# Patient Record
Sex: MALE | Race: WHITE | Employment: PART TIME | URBAN - NONMETROPOLITAN AREA
[De-identification: names, ages, dates, MRNs, and addresses within clinical notes are randomized per-mention and may not be internally consistent; named-entity substitution may affect disease eponyms.]

---

## 2019-07-29 RX ORDER — FLUTICASONE PROPIONATE 50 MCG
2 SPRAY, SUSPENSION (ML) NASAL PRN
COMMUNITY
Start: 2018-08-09 | End: 2020-01-16 | Stop reason: SDUPTHER

## 2019-07-29 RX ORDER — FEXOFENADINE HCL 180 MG/1
180 TABLET ORAL DAILY
COMMUNITY
Start: 2016-09-21

## 2019-07-29 SDOH — HEALTH STABILITY: MENTAL HEALTH: HOW OFTEN DO YOU HAVE A DRINK CONTAINING ALCOHOL?: NEVER

## 2019-08-01 ENCOUNTER — CLINICAL DOCUMENTATION (OUTPATIENT)
Dept: FAMILY MEDICINE CLINIC | Age: 19
End: 2019-08-01

## 2019-08-01 NOTE — PROGRESS NOTES
Note created from [address] [place] [address] for Dr. Naye Philippe 19 [address] Acct#: [id number]  Maranda Carmen : 2000 Sex: M Age: 19 years  Subjective  CC: Patient is doing quite well and is lost another 10 pounds over the last year. Continues to  exercise on a regular basis. Denying excessive thirst, urination, hunger. Very strong family history  of early heart disease and diabetes. The patient does have allergic rhinitis. Symptoms are  controlled with a combination of Flonase and Allegra. He does need to take these on a daily basis. We did discuss possible referral to an allergist if symptoms worsen. He denies any wheezing or  difficulty breathing. HPI:  ROS:  Const: General health stated as good. Eyes: Denies eye symptoms. ENMT: Reports itching of the ears. Reports allergies. CV: Denies cardiovascular symptoms. Resp: Denies respiratory symptoms. GI: Minimally elevated liver functions  : Denies urinary symptoms. Musculo: Denies musculoskeletal symptoms. Skin: Denies skin, hair and nail symptoms. Neuro: GANGLION CYSTS  Psych: Denies psychiatric symptoms. Endocrine: Reports obesity. Hema/Lymph: Denies hematologic symptoms. Current Meds: Allegra Allergy 180 mg 1 by mouth every day  Flonase 50 mcg/Act 2 sprays ea nostril qhs as needed  Allergies: NKDA  PMH:  Health Maintenance:  Physical Exam - (2018)  Menactra - (3/25/2016)  Medical Problems:  Seasonal Allergies, Obesity, Ganglion Cysts, Hyperlipidemia  Surgical Hx:  Ganglion Cyst Removal - CATERINA-WRIST/TAILBONE  Reviewed and updated. FH:  Father:   --had hx of kidney stones, Glioblastoma Multiforme- Age 35. Mother:  Obesity, Non Insulin Dependent Diabetes, Hypertension, Hyperlipidemia. Reviewed, no changes. SH:  Marital: Single. Personal Habits: Cigarette Use: No Exposure To 2ND Hand Smoke, Does Not Smoke. Alcohol:  Never used alcohol. Daily Caffeine: Consumes on average 1 soda per day. Exercise Type: unspecified  Care Plan:  Lab Orders : Comp Metabolic Panel W/GFR  Lipid Panel  Rafa Munoz  2000 Page #3  Assessment #3: Z00.00 Encounter for general adult medical examination without abnormal  findings  Care Plan:  Follow Up : Followup:. Patient is to have lab work. Medication management was performed. The patient is  to be seen back in one year. He is congratulated on his weight loss and exercise regimen and  urged to continue. Seen By: Electronically signed by Rachid Hinojosa.  Carolina Ndiaye M.D. on 2018 at 2:24 pm

## 2019-08-02 ENCOUNTER — OFFICE VISIT (OUTPATIENT)
Dept: FAMILY MEDICINE CLINIC | Age: 19
End: 2019-08-02
Payer: COMMERCIAL

## 2019-08-02 VITALS
WEIGHT: 231 LBS | HEART RATE: 75 BPM | OXYGEN SATURATION: 98 % | DIASTOLIC BLOOD PRESSURE: 72 MMHG | HEIGHT: 69 IN | SYSTOLIC BLOOD PRESSURE: 118 MMHG | BODY MASS INDEX: 34.21 KG/M2

## 2019-08-02 DIAGNOSIS — E78.2 MIXED HYPERLIPIDEMIA: ICD-10-CM

## 2019-08-02 DIAGNOSIS — J30.9 ALLERGIC RHINITIS, UNSPECIFIED SEASONALITY, UNSPECIFIED TRIGGER: Primary | ICD-10-CM

## 2019-08-02 DIAGNOSIS — E66.09 CLASS 1 OBESITY DUE TO EXCESS CALORIES WITHOUT SERIOUS COMORBIDITY WITH BODY MASS INDEX (BMI) OF 34.0 TO 34.9 IN ADULT: ICD-10-CM

## 2019-08-02 PROBLEM — E78.5 HYPERLIPIDEMIA: Status: ACTIVE | Noted: 2019-08-02

## 2019-08-02 LAB
CHP ED QC CHECK: NORMAL
GLUCOSE BLD-MCNC: 95 MG/DL

## 2019-08-02 PROCEDURE — 82962 GLUCOSE BLOOD TEST: CPT | Performed by: INTERNAL MEDICINE

## 2019-08-02 PROCEDURE — 99395 PREV VISIT EST AGE 18-39: CPT | Performed by: INTERNAL MEDICINE

## 2019-08-02 RX ORDER — MONTELUKAST SODIUM 10 MG/1
10 TABLET ORAL DAILY
Qty: 30 TABLET | Refills: 3 | Status: SHIPPED | OUTPATIENT
Start: 2019-08-02 | End: 2019-11-13 | Stop reason: SDUPTHER

## 2019-08-02 NOTE — PROGRESS NOTES
Patient has never smoked. Objective  BP: 120/62 Pulse: 70 Ht: 69\" 5'9\" Ht cm: 175.3 Wt: 207lb Wt [id number] BMI: 30.6 BSA: 2.10  Exam:  Const: Appears healthy, well developed and well nourished. Appears obese. Eyes: EOMI in both eyes. PERRL. ENMT: External ears WNL. Tympanic membranes: decreased light reflex. External nose WNL. Moistness and normal color of the nasal mucosae. Septum is in the midline. Dentition is in good  repair. Gums appear healthy. Posterior pharynx shows moderate and clear postnasal drip, but no  exudate, irritation or redness. Neck: Supple and symmetric. Palpation reveals no adenopathy. No masses appreciated. Thyroid: no nodule appreciated or thyromegaly. No jugular venous distention. Resp: Respirations are unlabored. Respiration rate is normal. Auscultate good airflow. No rales,  rhonchi or wheezes appreciated over the lungs bilaterally. CV: Rhythm is regular. S1 is normal. S2 is normal. Carotids: no bruits. Abdominal aorta: not  palpable. Pedal pulses: 2+ and equal bilaterally. Extremities: No clubbing, cyanosis or edema. Abdomen: Bowel sounds are normoactive. Palpation reveals softness, with no distension,  organomegaly or tenderness. No abdominal masses palpable. No palpable hepatosplenomegaly. Musculo: Walks with a normal gait. Upper Extremities: ROM: Full ROM bilaterally. Lower  Extremities: ROM: Full ROM bilaterally. Skin: Dry and warm with no rash. Neuro: Alert and oriented x3. Mood is normal. Affect is normal. Speech is articulate and  fluent. DTR's are symmetric, intact and 2+ bilaterally. Sylwia Molina was seen today for annual exam.    Diagnoses and all orders for this visit:    Allergic rhinitis, unspecified seasonality, unspecified trigger  -     External Referral To Allergy  -     montelukast (SINGULAIR) 10 MG tablet;  Take 1 tablet by mouth daily    Class 1 obesity due to excess calories without serious comorbidity with body mass index (BMI) of 34.0 to 34.9 in adult  -

## 2019-10-09 ENCOUNTER — OFFICE VISIT (OUTPATIENT)
Dept: FAMILY MEDICINE CLINIC | Age: 19
End: 2019-10-09
Payer: COMMERCIAL

## 2019-10-09 DIAGNOSIS — F41.9 ANXIETY: ICD-10-CM

## 2019-10-09 PROCEDURE — 99213 OFFICE O/P EST LOW 20 MIN: CPT | Performed by: INTERNAL MEDICINE

## 2019-11-13 ENCOUNTER — OFFICE VISIT (OUTPATIENT)
Dept: FAMILY MEDICINE CLINIC | Age: 19
End: 2019-11-13
Payer: COMMERCIAL

## 2019-11-13 VITALS
BODY MASS INDEX: 33.37 KG/M2 | WEIGHT: 226 LBS | HEART RATE: 74 BPM | DIASTOLIC BLOOD PRESSURE: 62 MMHG | TEMPERATURE: 98.5 F | SYSTOLIC BLOOD PRESSURE: 126 MMHG | OXYGEN SATURATION: 98 %

## 2019-11-13 DIAGNOSIS — J30.9 ALLERGIC RHINITIS, UNSPECIFIED SEASONALITY, UNSPECIFIED TRIGGER: ICD-10-CM

## 2019-11-13 DIAGNOSIS — F41.9 ANXIETY: ICD-10-CM

## 2019-11-13 DIAGNOSIS — Z23 NEED FOR IMMUNIZATION AGAINST INFLUENZA: Primary | ICD-10-CM

## 2019-11-13 DIAGNOSIS — E78.2 MIXED HYPERLIPIDEMIA: ICD-10-CM

## 2019-11-13 PROCEDURE — 90686 IIV4 VACC NO PRSV 0.5 ML IM: CPT | Performed by: INTERNAL MEDICINE

## 2019-11-13 PROCEDURE — 90471 IMMUNIZATION ADMIN: CPT | Performed by: INTERNAL MEDICINE

## 2019-11-13 PROCEDURE — 99213 OFFICE O/P EST LOW 20 MIN: CPT | Performed by: INTERNAL MEDICINE

## 2019-11-13 RX ORDER — MONTELUKAST SODIUM 10 MG/1
10 TABLET ORAL DAILY
Qty: 30 TABLET | Refills: 3 | Status: SHIPPED
Start: 2019-11-13 | End: 2020-04-29 | Stop reason: SDUPTHER

## 2019-11-13 RX ORDER — SERTRALINE HYDROCHLORIDE 100 MG/1
100 TABLET, FILM COATED ORAL DAILY
Qty: 30 TABLET | Refills: 5 | Status: SHIPPED
Start: 2019-11-13 | End: 2020-02-07 | Stop reason: ALTCHOICE

## 2019-11-13 ASSESSMENT — PATIENT HEALTH QUESTIONNAIRE - PHQ9
SUM OF ALL RESPONSES TO PHQ QUESTIONS 1-9: 1
SUM OF ALL RESPONSES TO PHQ9 QUESTIONS 1 & 2: 1
1. LITTLE INTEREST OR PLEASURE IN DOING THINGS: 0
2. FEELING DOWN, DEPRESSED OR HOPELESS: 1
SUM OF ALL RESPONSES TO PHQ QUESTIONS 1-9: 1

## 2020-01-16 NOTE — TELEPHONE ENCOUNTER
Last Appointment:  11/13/2019  Future Appointments   Date Time Provider Chhaya Du   8/7/2020  7:30 AM Elly Mayfield  [address]      Patient's mother calling in to request a refill on Flonase sent to [facility] in [place].   Order pended, thank you

## 2020-01-17 RX ORDER — FLUTICASONE PROPIONATE 50 MCG
2 SPRAY, SUSPENSION (ML) NASAL PRN
Qty: 1 BOTTLE | Refills: 2 | Status: SHIPPED
Start: 2020-01-17 | End: 2020-10-28 | Stop reason: SDUPTHER

## 2020-02-05 ENCOUNTER — TELEPHONE (OUTPATIENT)
Dept: FAMILY MEDICINE CLINIC | Age: 20
End: 2020-02-05

## 2020-02-05 NOTE — TELEPHONE ENCOUNTER
Mom  -  Yordy Jennings  [phone]        She called to ask if he could be seen by Dr Paul Acosta today for his severe anxiety. He had stopped taking the Prozac and is having a \"really hard time\"      She is there because she was afraid to leave him alone. He is not suicidal or going to hurt himself, but very emotional.        Dr Alie Luu advise to them is to go to the ER at [facility] on [address]. Mom was agreeable and will take him there now.

## 2020-02-07 ENCOUNTER — OFFICE VISIT (OUTPATIENT)
Dept: FAMILY MEDICINE CLINIC | Age: 20
End: 2020-02-07
Payer: COMMERCIAL

## 2020-02-07 VITALS — OXYGEN SATURATION: 97 % | DIASTOLIC BLOOD PRESSURE: 80 MMHG | HEART RATE: 76 BPM | SYSTOLIC BLOOD PRESSURE: 124 MMHG

## 2020-02-07 PROBLEM — F32.9 REACTIVE DEPRESSION: Status: ACTIVE | Noted: 2020-02-07

## 2020-02-07 PROCEDURE — 99213 OFFICE O/P EST LOW 20 MIN: CPT | Performed by: INTERNAL MEDICINE

## 2020-02-07 RX ORDER — CITALOPRAM 20 MG/1
20 TABLET ORAL DAILY
Qty: 30 TABLET | Refills: 0 | Status: SHIPPED
Start: 2020-02-07 | End: 2020-02-17 | Stop reason: SDUPTHER

## 2020-02-07 RX ORDER — ALPRAZOLAM 0.25 MG/1
0.25 TABLET ORAL NIGHTLY PRN
Qty: 10 TABLET | Refills: 0 | Status: SHIPPED | OUTPATIENT
Start: 2020-02-07 | End: 2020-02-17 | Stop reason: SDUPTHER

## 2020-02-07 NOTE — PROGRESS NOTES
The patient had more difficulty with the higher dose of sertraline. He is currently seeing a counselor. Patient is dropped most of his courses because of the cost and is working at [employer]. His mother is also receiving counseling. He had thoughts yesterday that he did not want to be alive but he had no plan on killing himself or harming others. The patient is having some difficulty sleeping at night. He had done fairly well with the lower dose of sertraline although it was not totally effective at relieving his symptoms. HPI:  ROS:  Const: General health stated as good. Eyes: Denies eye symptoms. ENMT: Reports itching of the ears. Reports allergies. CV: Denies cardiovascular symptoms. Resp: Denies respiratory symptoms. GI: Negative  : Denies urinary symptoms. Musculo: Denies musculoskeletal symptoms. Skin: Denies skin, hair and nail symptoms. Neuro: GANGLION CYSTS  Psych:Stress/anxiety. Endocrine: Reports obesity. Hema/Lymph: Denies hematologic symptoms. Current Meds: Allegra Allergy 180 mg 1 by mouth every day  Flonase 50 mcg/Act 2 sprays ea nostril qhs as needed  Allergies: NKDA  PMH:  Health Maintenance:  Physical Exam - (19)  Menactra - (3/25/2016)  Medical Problems:  Seasonal Allergies Reffered to [facility] 2019  , Obesity, Ganglion Cysts, Hyperlipidemia  Anxiety-good response to Sertraline 2019  Surgical Hx:  Ganglion Cyst Removal - CATERINA-WRIST/TAILBONE  Reviewed and updated. FH:  Father:   --had hx of kidney stones, Glioblastoma Multiforme- Age 35. Mother:  Obesity, Non Insulin Dependent Diabetes, Hypertension, Hyperlipidemia. Reviewed, no changes. SH:  Marital: Single. Personal Habits: Cigarette Use: No Exposure To 2ND Hand Smoke, Does Not Smoke. Alcohol:  Never used alcohol. Daily Caffeine: Consumes on average 1 soda per day. Exercise Type: Walks  sporadically. Reviewed, no changes. Date: 2018  Was the patient queried about smoking behavior?  Yes No  Does the patient currently smoke? Smoking: Patient has never smoked. Objective  BP: 120/62 Pulse: 70 Ht: 69\" 5'9\" Ht cm: 175.3 Wt: 207lb Wt k.895 BMI: 30.6 BSA: 2.10  Exam:  Const: Appears healthy, well developed and well nourished. Appears obese. Eyes: EOMI in both eyes. PERRL. ENMT: External ears WNL. Tympanic membranes: decreased light reflex. External nose WNL. Moistness and normal color of the nasal mucosae. Septum is in the midline. Dentition is in good  repair. Gums appear healthy. Posterior pharynx shows moderate and clear postnasal drip, but no  exudate, irritation or redness. Neck: Supple and symmetric. Palpation reveals no adenopathy. No masses appreciated. Thyroid: no nodule appreciated or thyromegaly. No jugular venous distention. Resp: Respirations are unlabored. Respiration rate is normal. Auscultate good airflow. No rales,  rhonchi or wheezes appreciated over the lungs bilaterally. CV: Rhythm is regular. S1 is normal. S2 is normal. Carotids: no bruits. Abdominal aorta: not  palpable. Pedal pulses: 2+ and equal bilaterally. Extremities: No clubbing, cyanosis or edema. Abdomen: Bowel sounds are normoactive. Palpation reveals softness, with no distension,  organomegaly or tenderness. No abdominal masses palpable. No palpable hepatosplenomegaly. Musculo: Walks with a normal gait. Upper Extremities: ROM: Full ROM bilaterally. Lower  Extremities: ROM: Full ROM bilaterally. Skin: Dry and warm with no rash. Neuro: Alert and oriented x3. Mood is normal. Affect is normal. Speech is articulate and  fluent. DTR's are symmetric, intact and 2+ bilaterally. Jg Lara was seen today for discuss medications. Diagnoses and all orders for this visit:    Anxiety  -     ALPRAZolam (XANAX) 0.25 MG tablet; Take 1 tablet by mouth nightly as needed for Anxiety for up to 10 days. Reactive depression    Other orders  -     citalopram (CELEXA) 20 MG tablet;  Take 1 tablet by mouth daily    The patient

## 2020-02-17 ENCOUNTER — OFFICE VISIT (OUTPATIENT)
Dept: PRIMARY CARE CLINIC | Age: 20
End: 2020-02-17
Payer: COMMERCIAL

## 2020-02-17 VITALS
DIASTOLIC BLOOD PRESSURE: 78 MMHG | HEART RATE: 92 BPM | TEMPERATURE: 97.7 F | HEIGHT: 71 IN | RESPIRATION RATE: 18 BRPM | BODY MASS INDEX: 32.48 KG/M2 | OXYGEN SATURATION: 98 % | SYSTOLIC BLOOD PRESSURE: 128 MMHG | WEIGHT: 232 LBS

## 2020-02-17 PROBLEM — F51.04 PSYCHOPHYSIOLOGICAL INSOMNIA: Status: ACTIVE | Noted: 2020-02-17

## 2020-02-17 PROCEDURE — 99213 OFFICE O/P EST LOW 20 MIN: CPT | Performed by: INTERNAL MEDICINE

## 2020-02-17 RX ORDER — CITALOPRAM 20 MG/1
20 TABLET ORAL DAILY
Qty: 90 TABLET | Refills: 1 | Status: SHIPPED | OUTPATIENT
Start: 2020-02-17 | End: 2020-04-29 | Stop reason: SDUPTHER

## 2020-02-17 RX ORDER — ALPRAZOLAM 0.25 MG/1
0.25 TABLET ORAL NIGHTLY PRN
Qty: 10 TABLET | Refills: 1 | Status: SHIPPED | OUTPATIENT
Start: 2020-02-17 | End: 2020-03-03

## 2020-02-17 NOTE — PROGRESS NOTES
Patient seems to be doing a lot better with the use of Celexa. He is also sleeping better with the Xanax. Anxiety level seems to be much better than it was last week. The patient is decided to drop his major into to pursue human resources. He believes this will be a much better fit. He does have a very good job at [employer]. and he would like to continue working there. HPI:  ROS:  Const: General health stated as good. Eyes: Denies eye symptoms. ENMT: Reports itching of the ears. Reports allergies. CV: Denies cardiovascular symptoms. Resp: Denies respiratory symptoms. GI: Negative  : Denies urinary symptoms. Musculo: Denies musculoskeletal symptoms. Skin: Denies skin, hair and nail symptoms. Neuro: GANGLION CYSTS  Psych:Stress/anxiety. Endocrine: Reports obesity. Hema/Lymph: Denies hematologic symptoms. Current Meds: Allegra Allergy 180 mg 1 by mouth every day  Flonase 50 mcg/Act 2 sprays ea nostril qhs as needed  Allergies: NKDA  PMH:  Health Maintenance:  Physical Exam - (19)  Menactra - (3/25/2016)  Medical Problems:  Seasonal Allergies Reffered to [facility] 2019  , Obesity, Ganglion Cysts, Hyperlipidemia  Anxiety-good response to Sertraline 2019  Surgical Hx:  Ganglion Cyst Removal - CATERINA-WRIST/TAILBONE  Reviewed and updated. FH:  Father:   --had hx of kidney stones, Glioblastoma Multiforme- Age 35. Mother:  Obesity, Non Insulin Dependent Diabetes, Hypertension, Hyperlipidemia. Reviewed, no changes. SH:  Marital: Single. Personal Habits: Cigarette Use: No Exposure To 2ND Hand Smoke, Does Not Smoke. Alcohol:  Never used alcohol. Daily Caffeine: Consumes on average 1 soda per day. Exercise Type: Walks  sporadically. Reviewed, no changes. Date: 2018  Was the patient queried about smoking behavior? Yes No  Does the patient currently smoke? Smoking: Patient has never smoked.   Objective  BP: 120/62 Pulse: 70 Ht: 69\" 5'9\" Ht cm: 175.3 Wt: 207lb Wt k.895 BMI:

## 2020-03-24 RX ORDER — ALPRAZOLAM 0.25 MG/1
0.25 TABLET ORAL 3 TIMES DAILY PRN
Qty: 30 TABLET | Refills: 1 | Status: SHIPPED | OUTPATIENT
Start: 2020-03-24 | End: 2020-04-23

## 2020-04-29 ENCOUNTER — VIRTUAL VISIT (OUTPATIENT)
Dept: PRIMARY CARE CLINIC | Age: 20
End: 2020-04-29
Payer: COMMERCIAL

## 2020-04-29 PROCEDURE — 99213 OFFICE O/P EST LOW 20 MIN: CPT | Performed by: INTERNAL MEDICINE

## 2020-04-29 RX ORDER — MONTELUKAST SODIUM 10 MG/1
10 TABLET ORAL DAILY
Qty: 30 TABLET | Refills: 5 | Status: SHIPPED
Start: 2020-04-29 | End: 2020-10-28 | Stop reason: SDUPTHER

## 2020-04-29 RX ORDER — ALPRAZOLAM 0.25 MG
TABLET ORAL
COMMUNITY
Start: 2020-03-24 | End: 2020-04-29 | Stop reason: SDUPTHER

## 2020-04-29 RX ORDER — CITALOPRAM 20 MG/1
20 TABLET ORAL DAILY
Qty: 90 TABLET | Refills: 1 | Status: SHIPPED
Start: 2020-04-29 | End: 2020-10-28 | Stop reason: SDUPTHER

## 2020-04-29 RX ORDER — ALPRAZOLAM 0.25 MG/1
TABLET ORAL
Qty: 90 TABLET | Refills: 5 | Status: SHIPPED
Start: 2020-04-29 | End: 2020-10-28 | Stop reason: SDUPTHER

## 2020-04-29 NOTE — PROGRESS NOTES
Patient seems to be doing a lot better in terms of his anxiety and depression. He states that the Celexa is working quite well. No significant side effects from medication. He is still seeing a counselor on a regular basis and in fact saw the counselor today. He is decided not to go back to school in the fall and just wait for a while until he is made up his mind about what career he wants to pursue. He thinks this will help quite a bit with his anxiety issues. The patient's allergy symptoms are well controlled on triple therapy. He has not had any symptoms of a sinus infection. This is a video encounter with the patient. HPI:  ROS:  Const: General health stated as good. Eyes: Denies eye symptoms. ENMT: Reports itching of the ears. Reports allergies. Well controlled on current regimen. CV: Denies cardiovascular symptoms. Resp: Denies respiratory symptoms. GI: Negative  : Denies urinary symptoms. Musculo: Denies musculoskeletal symptoms. Skin: Denies skin, hair and nail symptoms. Neuro: GANGLION CYSTS  Psych:Stress/anxiety. Endocrine: Reports obesity. Hema/Lymph: Denies hematologic symptoms.     Current Outpatient Medications:     montelukast (SINGULAIR) 10 MG tablet, Take 1 tablet by mouth daily, Disp: 30 tablet, Rfl: 5    ALPRAZolam (XANAX) 0.25 MG tablet, TAKE ONE TABLET BY MOUTH THREE TIMES A DAY AS NEEDED FOR ANXIETY FOR UP TO 30 DAYS, Disp: 90 tablet, Rfl: 5    citalopram (CELEXA) 20 MG tablet, Take 1 tablet by mouth daily, Disp: 90 tablet, Rfl: 1    fluticasone (FLONASE ALLERGY RELIEF) 50 MCG/ACT nasal spray, 2 sprays by Each Nostril route as needed for Allergies, Disp: 1 Bottle, Rfl: 2    fexofenadine (ALLEGRA ALLERGY) 180 MG tablet, Take 180 mg by mouth daily, Disp: , Rfl:   Allergies: NKDA  PMH:  Health Maintenance:  Physical Exam - (8/2/19)  Menactra - (3/25/2016)  Medical Problems:  Seasonal Allergies Reffered to [facility] 11/13/2019  , Obesity, Ganglion Cysts, Hyperlipidemia  Anxiety-good response to Sertraline 2019  Surgical Hx:  Ganglion Cyst Removal - CATERINA-WRIST/TAILBONE  Reviewed and updated. FH:  Father:   --had hx of kidney stones, Glioblastoma Multiforme- Age 35. Mother:  Obesity, Non Insulin Dependent Diabetes, Hypertension, Hyperlipidemia. Reviewed, no changes. SH:  Marital: Single. Personal Habits: Cigarette Use: No Exposure To 2ND Hand Smoke, Does Not Smoke. Alcohol:  Never used alcohol. Daily Caffeine: Consumes on average 1 soda per day. Exercise Type: Walks  sporadically. Reviewed, no changes. Date: 2020  Was the patient queried about smoking behavior? Yes   Does the patient currently smoke? Smoking: Patient has never smoked. Anat Reynoso was seen today for other. Diagnoses and all orders for this visit:    Anxiety  -     ALPRAZolam (XANAX) 0.25 MG tablet; TAKE ONE TABLET BY MOUTH THREE TIMES A DAY AS NEEDED FOR ANXIETY FOR UP TO 30 DAYS    Allergic rhinitis, unspecified seasonality, unspecified trigger  -     montelukast (SINGULAIR) 10 MG tablet; Take 1 tablet by mouth daily    Reactive depression    Other orders  -     citalopram (CELEXA) 20 MG tablet; Take 1 tablet by mouth daily    Suzanne Montague is a 23 y.o. male being evaluated by a Virtual Visit (video visit) encounter to address concerns as mentioned above. A caregiver was present when appropriate. Due to this being a TeleHealth encounter (During [facility] public health emergency), evaluation of the following organ systems was limited: Vitals/Constitutional/EENT/Resp/CV/GI//MS/Neuro/Skin/Heme-Lymph-Imm. Pursuant to the emergency declaration under the [facility] authority and the "[employer] and [employer] Act, this Virtual Visit was conducted with patient's (and/or legal guardian's) consent, to reduce the patient's risk of exposure to COVID-19 and provide necessary medical care.   The

## 2020-10-28 ENCOUNTER — OFFICE VISIT (OUTPATIENT)
Dept: FAMILY MEDICINE CLINIC | Age: 20
End: 2020-10-28
Payer: COMMERCIAL

## 2020-10-28 VITALS
HEART RATE: 81 BPM | WEIGHT: 258 LBS | SYSTOLIC BLOOD PRESSURE: 124 MMHG | DIASTOLIC BLOOD PRESSURE: 76 MMHG | OXYGEN SATURATION: 98 % | HEIGHT: 71 IN | BODY MASS INDEX: 36.12 KG/M2

## 2020-10-28 PROBLEM — E66.09 CLASS 2 OBESITY DUE TO EXCESS CALORIES WITHOUT SERIOUS COMORBIDITY WITH BODY MASS INDEX (BMI) OF 35.0 TO 35.9 IN ADULT: Status: ACTIVE | Noted: 2020-10-28

## 2020-10-28 PROBLEM — E66.812 CLASS 2 OBESITY DUE TO EXCESS CALORIES WITHOUT SERIOUS COMORBIDITY WITH BODY MASS INDEX (BMI) OF 35.0 TO 35.9 IN ADULT: Status: ACTIVE | Noted: 2020-10-28

## 2020-10-28 PROCEDURE — 90686 IIV4 VACC NO PRSV 0.5 ML IM: CPT | Performed by: INTERNAL MEDICINE

## 2020-10-28 PROCEDURE — 99213 OFFICE O/P EST LOW 20 MIN: CPT | Performed by: INTERNAL MEDICINE

## 2020-10-28 PROCEDURE — 90471 IMMUNIZATION ADMIN: CPT | Performed by: INTERNAL MEDICINE

## 2020-10-28 RX ORDER — MONTELUKAST SODIUM 10 MG/1
10 TABLET ORAL DAILY
Qty: 30 TABLET | Refills: 5 | Status: SHIPPED
Start: 2020-10-28 | End: 2021-05-05 | Stop reason: SDUPTHER

## 2020-10-28 RX ORDER — FLUTICASONE PROPIONATE 50 MCG
2 SPRAY, SUSPENSION (ML) NASAL PRN
Qty: 1 BOTTLE | Refills: 5 | Status: SHIPPED | OUTPATIENT
Start: 2020-10-28

## 2020-10-28 RX ORDER — ALPRAZOLAM 0.25 MG/1
TABLET ORAL
Qty: 90 TABLET | Refills: 5 | Status: SHIPPED | OUTPATIENT
Start: 2020-10-28 | End: 2020-11-27

## 2020-10-28 RX ORDER — CITALOPRAM 20 MG/1
20 TABLET ORAL DAILY
Qty: 90 TABLET | Refills: 1 | Status: SHIPPED
Start: 2020-10-28 | End: 2021-05-05 | Stop reason: SDUPTHER

## 2020-10-28 RX ORDER — ALPRAZOLAM 0.5 MG/1
0.25 TABLET ORAL NIGHTLY PRN
COMMUNITY
End: 2021-06-03 | Stop reason: SDUPTHER

## 2020-10-28 NOTE — PROGRESS NOTES
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 Patient is returning to school but is struggling with online classes. Patient has gained a tremendous amount of weight over this last year. He is denying excessive thirst, urination, hunger. Allergies are well controlled on current medications. He does have a history of hyperlipidemia and this will be remeasured. Denies cardiac or respiratory symptoms. He does have some wrist discomfort especially with extension of the wrist on the left side. HPI:  ROS:  Const: General health stated as good. Eyes: Denies eye symptoms. ENMT: Reports itching of the ears. Reports allergies. CV: Denies cardiovascular symptoms. Resp: Denies respiratory symptoms. GI: Negative  : Denies urinary symptoms. Musculo: Left wrist discomfort. Skin: Denies skin, hair and nail symptoms. Neuro: GANGLION CYSTS  Psych:Stress/anxiety. Endocrine: Reports obesity. Hema/Lymph: Denies hematologic symptoms. Current Meds: Allegra Allergy 180 mg 1 by mouth every day  Rajendra Lopez was seen today for annual exam.    Diagnoses and all orders for this visit:    Mixed hyperlipidemia  -     Lipid Panel; Future  -     Comprehensive Metabolic Panel; Future    Allergic rhinitis, unspecified seasonality, unspecified trigger  -     montelukast (SINGULAIR) 10 MG tablet; Take 1 tablet by mouth daily    Anxiety  -     ALPRAZolam (XANAX) 0.25 MG tablet; TAKE ONE TABLET BY MOUTH THREE TIMES A DAY AS NEEDED FOR ANXIETY FOR UP TO 30 DAYS    Other orders  -     citalopram (CELEXA) 20 MG tablet;  Take 1 tablet by mouth daily  -     fluticasone (FLONASE ALLERGY RELIEF) 50 MCG/ACT nasal spray; 2 sprays by Each Nostril route as needed for Allergies  -     INFLUENZA, QUADV, 3 YRS AND OLDER, IM PF, PREFILL SYR OR SDV, 0.5ML (AFLURIA QUADV, PF)      Allergies: NKDA  PMH:  Health Maintenance:  Physical Exam - 10/28/2020  Menactra - (3/25/2016)  Flu 10/28/2020  Medical Problems:  Seasonal Allergies Reffered to [facility] 2019  , Obesity, Ganglion Cysts, Hyperlipidemia  Anxiety-good response to Sertraline 2019  Surgical Hx:  Ganglion Cyst Removal - CATERINA-WRIST/TAILBONE  Reviewed and updated. FH:  Father:   --had hx of kidney stones, Glioblastoma Multiforme- Age 35. Mother:  Obesity, Non Insulin Dependent Diabetes, Hypertension, Hyperlipidemia. Reviewed, no changes. SH:  Marital: Single. Personal Habits: Cigarette Use: No Exposure To 2ND Hand Smoke, Does Not Smoke. Alcohol:  Never used alcohol. Daily Caffeine: Consumes on average 1 soda per day. Exercise Type: Walks  sporadically. Reviewed, no changes. Date: 2018  Was the patient queried about smoking behavior? Yes No  Does the patient currently smoke? Smoking: Patient has never smoked. Objective  Vitals:    10/28/20 1510   BP: 124/76   Pulse: 81   SpO2: 98%     Exam:  Const: Appears healthy, well developed and well nourished. Appears obese. Eyes: EOMI in both eyes. PERRL. ENMT: External ears WNL. Tympanic membranes: decreased light reflex. External nose WNL. Neck: Supple and symmetric. Palpation reveals no adenopathy. No masses appreciated. Thyroid: no nodule appreciated or thyromegaly. No jugular venous distention. Resp: Respirations are unlabored. Respiration rate is normal. Auscultate good airflow. No rales,  rhonchi or wheezes appreciated over the lungs bilaterally. CV: Rhythm is regular. S1 is normal. S2 is normal. Carotids: no bruits. Abdominal aorta: not  palpable. Pedal pulses: 2+ and equal bilaterally. Extremities: No clubbing, cyanosis or edema. Abdomen: Bowel sounds are normoactive. Palpation reveals softness, with no distension,  organomegaly or tenderness. No abdominal masses palpable. No palpable hepatosplenomegaly. Musculo: Walks with a normal gait. Upper Extremities: ROM: Full ROM bilaterally.   Slight discomfort with extension of the left wrist.  No evidence of synovial inflammation. Lower  Extremities: ROM: Full ROM bilaterally. Skin: Dry and warm with no rash. Neuro: Alert and oriented x3. Mood is normal. Affect is normal. Speech is articulate and  fluent. DTR's are symmetric, intact and 2+ bilaterally. Alayna Oneill was seen today for annual exam.    Diagnoses and all orders for this visit:    Mixed hyperlipidemia  -     Lipid Panel; Future  -     Comprehensive Metabolic Panel; Future    Allergic rhinitis, unspecified seasonality, unspecified trigger  -     montelukast (SINGULAIR) 10 MG tablet; Take 1 tablet by mouth daily    Anxiety  -     ALPRAZolam (XANAX) 0.25 MG tablet; TAKE ONE TABLET BY MOUTH THREE TIMES A DAY AS NEEDED FOR ANXIETY FOR UP TO 30 DAYS    Class 2 obesity due to excess calories without serious comorbidity with body mass index (BMI) of 35.0 to 35.9 in adult    Other orders  -     citalopram (CELEXA) 20 MG tablet; Take 1 tablet by mouth daily  -     fluticasone (FLONASE ALLERGY RELIEF) 50 MCG/ACT nasal spray; 2 sprays by Each Nostril route as needed for Allergies  -     INFLUENZA, QUADV, 3 YRS AND OLDER, IM PF, PREFILL SYR OR SDV, 0.5ML (AFLURIA QUADV, PF)    Patient is to be seen back in 6 months. Medication management is performed. Patient will be getting lab work. He is to ice the affected wrist.  The patient will also get a flu shot today.

## 2021-03-19 ENCOUNTER — TELEPHONE (OUTPATIENT)
Dept: FAMILY MEDICINE CLINIC | Age: 21
End: 2021-03-19

## 2021-03-19 ENCOUNTER — OFFICE VISIT (OUTPATIENT)
Dept: FAMILY MEDICINE CLINIC | Age: 21
End: 2021-03-19
Payer: COMMERCIAL

## 2021-03-19 VITALS
WEIGHT: 258 LBS | TEMPERATURE: 98.1 F | OXYGEN SATURATION: 98 % | HEIGHT: 71 IN | BODY MASS INDEX: 36.12 KG/M2 | HEART RATE: 92 BPM

## 2021-03-19 DIAGNOSIS — N39.0 URINARY TRACT INFECTION WITH HEMATURIA, SITE UNSPECIFIED: ICD-10-CM

## 2021-03-19 DIAGNOSIS — R31.9 HEMATURIA, UNSPECIFIED TYPE: ICD-10-CM

## 2021-03-19 DIAGNOSIS — R35.0 FREQUENCY OF MICTURITION: ICD-10-CM

## 2021-03-19 DIAGNOSIS — R31.9 URINARY TRACT INFECTION WITH HEMATURIA, SITE UNSPECIFIED: ICD-10-CM

## 2021-03-19 DIAGNOSIS — R31.9 HEMATURIA, UNSPECIFIED TYPE: Primary | ICD-10-CM

## 2021-03-19 LAB
BILIRUBIN, POC: NEGATIVE
BLOOD URINE, POC: NEGATIVE
CLARITY, POC: NORMAL
COLOR, POC: NORMAL
GLUCOSE URINE, POC: NEGATIVE
KETONES, POC: NEGATIVE
LEUKOCYTE EST, POC: NORMAL
NITRITE, POC: NEGATIVE
PH, POC: 7
PROTEIN, POC: NEGATIVE
SPECIFIC GRAVITY, POC: 1.02
UROBILINOGEN, POC: 0.2

## 2021-03-19 PROCEDURE — 81002 URINALYSIS NONAUTO W/O SCOPE: CPT | Performed by: INTERNAL MEDICINE

## 2021-03-19 PROCEDURE — 99213 OFFICE O/P EST LOW 20 MIN: CPT | Performed by: INTERNAL MEDICINE

## 2021-03-19 RX ORDER — SULFAMETHOXAZOLE AND TRIMETHOPRIM 800; 160 MG/1; MG/1
1 TABLET ORAL 2 TIMES DAILY
Qty: 20 TABLET | Refills: 0 | Status: SHIPPED | OUTPATIENT
Start: 2021-03-19 | End: 2021-03-29

## 2021-03-19 NOTE — TELEPHONE ENCOUNTER
Pt called to say that he had blood in his urine. He wanted to schedule with Dr. Ej Bliss. I advised there was not openings and advised urgent care. Pt was in agreement.

## 2021-03-21 ASSESSMENT — ENCOUNTER SYMPTOMS
BLOOD IN STOOL: 0
SHORTNESS OF BREATH: 0
DIARRHEA: 0
CONSTIPATION: 0
COUGH: 0
NAUSEA: 0
ABDOMINAL PAIN: 0
VOMITING: 0

## 2021-03-21 NOTE — PROGRESS NOTES
[address] [facility] [address] [facility]     3/21/21  Annita Lozano : 2000 Sex: male  Age: 19 y.o. Chief Complaint   Patient presents with    Hematuria     pt states that he gets blood in his urine every once in awhile and just wants to get checked out       HPI    Patient of Dr. Mayra Espinoza presents to express care today complaining of noting blood in the urine off and on for over a month now. Claims to have some pain and pressure suprapubic region. Frequency of urination. Has never had this before. Denies fever or chills. He is sexually active he states with males. His sexual partner is asymptomatic. Review of Systems   Constitutional: Negative for chills and fever. Respiratory: Negative for cough and shortness of breath. Cardiovascular: Negative for chest pain. Gastrointestinal: Negative for abdominal pain, blood in stool, constipation, diarrhea, nausea and vomiting. He does admit to suprapubic tenderness and pressure   Genitourinary: Positive for frequency and hematuria. Negative for discharge, dysuria, flank pain, genital sores, testicular pain and urgency. Musculoskeletal: Negative for myalgias. Skin: Negative for rash. REST OF PERTINENT ROS GONE OVER AND WAS NEGATIVE.                Current Outpatient Medications:     sulfamethoxazole-trimethoprim (BACTRIM DS;SEPTRA DS) 800-160 MG per tablet, Take 1 tablet by mouth 2 times daily for 10 days, Disp: 20 tablet, Rfl: 0    ALPRAZolam (XANAX) 0.5 MG tablet, Take 0.25 mg by mouth nightly as needed for Sleep., Disp: , Rfl:     citalopram (CELEXA) 20 MG tablet, Take 1 tablet by mouth daily, Disp: 90 tablet, Rfl: 1    fluticasone (FLONASE ALLERGY RELIEF) 50 MCG/ACT nasal spray, 2 sprays by Each Nostril route as needed for Allergies, Disp: 1 Bottle, Rfl: 5    montelukast (SINGULAIR) 10 MG tablet, Take 1 tablet by mouth daily, Disp: 30 tablet, Rfl: 5    fexofenadine (ALLEGRA ALLERGY) 180 MG tablet, Take 180 mg by mouth daily, Disp: , Rfl:    No Known Allergies    Past Medical History:   Diagnosis Date    Ganglion cyst     Hyperlipidemia     Obesity     Seasonal allergies      Past Surgical History:   Procedure Laterality Date    CYST REMOVAL      CATERINA-WRIST/TAILBONE     Family History   Problem Relation Age of Onset    Obesity Mother     Diabetes Mother         Non Insulin Dependent Diabetes    High Blood Pressure Mother     High Cholesterol Mother     Kidney stones Father     Other Father         Gliboblastoma Multiforme     Social History     Socioeconomic History    Marital status: Single     Spouse name: Not on file    Number of children: Not on file    Years of education: Not on file    Highest education level: Not on file   Occupational History    Not on file   Social Needs    Financial resource strain: Not on file    Food insecurity     Worry: Not on file     Inability: Not on file    Transportation needs     Medical: Not on file     Non-medical: Not on file   Tobacco Use    Smoking status: Never Smoker    Smokeless tobacco: Never Used   Substance and Sexual Activity    Alcohol use: Never     Frequency: Never    Drug use: Not on file    Sexual activity: Not on file   Lifestyle    Physical activity     Days per week: Not on file     Minutes per session: Not on file    Stress: Not on file   Relationships    Social connections     Talks on phone: Not on file     Gets together: Not on file     Attends [employer] service: Not on file     Active member of club or organization: Not on file     Attends meetings of clubs or organizations: Not on file     Relationship status: Not on file    Intimate partner violence     Fear of current or ex partner: Not on file     Emotionally abused: Not on file     Physically abused: Not on file     Forced sexual activity: Not on file   Other Topics Concern    Not on file   Social History Narrative    Not on file       Vitals:    03/19/21 1536   Pulse: 92   Temp: 98.1 °F (36.7 °C) TempSrc: Temporal   SpO2: 98%   Weight: 258 lb (117 kg)   Height: 5' 11\" (1.803 m)       Physical Exam  Vitals signs and nursing note reviewed. Constitutional:       General: He is not in acute distress. Appearance: He is obese. Abdominal:      General: Bowel sounds are normal. There is no distension. Palpations: Abdomen is soft. Tenderness: There is no abdominal tenderness. There is no right CVA tenderness or left CVA tenderness. Genitourinary:     Penis: Normal.       Testes: Normal.   Skin:     Findings: No rash. Neurological:      Mental Status: He is alert. Psychiatric:         Mood and Affect: Mood normal.         Behavior: Behavior normal.         Thought Content: Thought content normal.         Judgment: Judgment normal.                   Assessment and Plan:  Elenita Kimball was seen today for hematuria. Diagnoses and all orders for this visit:    Hematuria, unspecified type  -     POCT Urinalysis no Micro  -     Culture, Urine; Future  -     C.TRACHOMATIS Kevyn Abraham; Future  -     Amb External Referral To Urology    Urinary tract infection with hematuria, site unspecified  -     Culture, Urine; Future  -     C.TRACHOMATIS Kevyn Abraham; Future  -     Amb External Referral To Urology    Frequency of micturition  -     Culture, Urine; Future  -     C.TRACHOMATIS Kevyn Abraham; Future  -     Amb External Referral To Urology    Other orders  -     sulfamethoxazole-trimethoprim (BACTRIM DS;SEPTRA DS) 800-160 MG per tablet; Take 1 tablet by mouth 2 times daily for 10 days    Plan: Urine dip had small leukocytes. We did send culture that is pending. Urine was also checked for GC/chlamydia/pending. Started Bactrim. Warned of potential side effects. Probiotic. Push fluids. Set him up with urology. Follow-up with PCP. Notify us if not improving.     Return urology referral.    Seen By:  Luan Davis MD      *Document was created using voice recognition software. Note was reviewed however may contain grammatical errors.

## 2021-03-22 ENCOUNTER — TELEPHONE (OUTPATIENT)
Dept: FAMILY MEDICINE CLINIC | Age: 21
End: 2021-03-22

## 2021-03-22 LAB — URINE CULTURE, ROUTINE: NORMAL

## 2021-03-22 NOTE — TELEPHONE ENCOUNTER
Urine culture was negative. He can stop the Bactrim at this time. Chlamydia/GC results are still pending. Keep follow-up with urology.

## 2021-03-24 ENCOUNTER — TELEPHONE (OUTPATIENT)
Dept: FAMILY MEDICINE CLINIC | Age: 21
End: 2021-03-24

## 2021-03-24 LAB
C. TRACHOMATIS DNA ,URINE: ABNORMAL
N. GONORRHOEAE DNA, URINE: NEGATIVE
SOURCE: ABNORMAL

## 2021-03-24 RX ORDER — DOXYCYCLINE HYCLATE 100 MG
100 TABLET ORAL 2 TIMES DAILY
Qty: 14 TABLET | Refills: 0 | Status: SHIPPED | OUTPATIENT
Start: 2021-03-24 | End: 2021-03-31

## 2021-03-26 NOTE — TELEPHONE ENCOUNTER
Left a message on the voicemail to call back to the nurse line. Left a message with his mom to have him call back regarding test results.

## 2021-05-05 ENCOUNTER — OFFICE VISIT (OUTPATIENT)
Dept: FAMILY MEDICINE CLINIC | Age: 21
End: 2021-05-05
Payer: COMMERCIAL

## 2021-05-05 VITALS
BODY MASS INDEX: 36.26 KG/M2 | HEART RATE: 94 BPM | OXYGEN SATURATION: 98 % | SYSTOLIC BLOOD PRESSURE: 134 MMHG | WEIGHT: 260 LBS | DIASTOLIC BLOOD PRESSURE: 70 MMHG | TEMPERATURE: 98.6 F

## 2021-05-05 DIAGNOSIS — J30.9 ALLERGIC RHINITIS, UNSPECIFIED SEASONALITY, UNSPECIFIED TRIGGER: ICD-10-CM

## 2021-05-05 DIAGNOSIS — A64 STD (MALE): ICD-10-CM

## 2021-05-05 DIAGNOSIS — M54.50 CHRONIC BILATERAL LOW BACK PAIN WITHOUT SCIATICA: Primary | ICD-10-CM

## 2021-05-05 DIAGNOSIS — G89.29 CHRONIC BILATERAL LOW BACK PAIN WITHOUT SCIATICA: Primary | ICD-10-CM

## 2021-05-05 PROCEDURE — 99214 OFFICE O/P EST MOD 30 MIN: CPT | Performed by: INTERNAL MEDICINE

## 2021-05-05 RX ORDER — MONTELUKAST SODIUM 10 MG/1
10 TABLET ORAL DAILY
Qty: 90 TABLET | Refills: 2 | Status: SHIPPED
Start: 2021-05-05 | End: 2021-12-06

## 2021-05-05 RX ORDER — CITALOPRAM 20 MG/1
20 TABLET ORAL DAILY
Qty: 90 TABLET | Refills: 1 | Status: SHIPPED
Start: 2021-05-05 | End: 2021-12-01

## 2021-05-05 NOTE — PROGRESS NOTES
This is a patient to recently had chlamydia. Patient had gross hematuria with this. He was seen initially by Dr. Lindsey Lucero in express and and subsequently by urology. He did have repeat testing. Patient did not have broad-spectrum testing for sexually transmitted diseases. Patient states that he is not sexually active currently. He does not really have any intention to become sexually active currently. I did advise him regarding condom use and also HIV prophylaxis. HPI:  ROS:  Const: General health stated as good. Eyes: Denies eye symptoms. ENMT: Reports itching of the ears. Reports allergies. CV: Denies cardiovascular symptoms. Resp: Denies respiratory symptoms. GI: Negative  : as per HPI  Musculo: Intermittent low back pain without radicular symptoms. Skin: Denies skin, hair and nail symptoms. Neuro: GANGLION CYSTS  Psych:Stress/anxiety. Endocrine: Reports obesity. Hema/Lymph: Denies hematologic symptoms. Current Meds: Allegra Allergy 180 mg 1 by mouth every day  Diagnoses and all orders for this visit:    Allergic rhinitis, unspecified seasonality, unspecified trigger      Allergies: NKDA  PMH:  Health Maintenance:  Physical Exam - 2021  Menactra - (3/25/2016)  Flu 10/28/2020  Medical Problems:  Seasonal Allergies Reffered to [facility] 2019  , Obesity, Ganglion Cysts, Hyperlipidemia  Anxiety-good response to Sertraline 2019  Surgical Hx:  Ganglion Cyst Removal - CATERINA-WRIST/TAILBONE  Reviewed and updated. FH:  Father:   --had hx of kidney stones, Glioblastoma Multiforme- Age 35. Mother:  Obesity, Non Insulin Dependent Diabetes, Hypertension, Hyperlipidemia. Reviewed, no changes. SH:  Marital: Single. Personal Habits: Cigarette Use: No Exposure To 2ND Hand Smoke, Does Not Smoke. Alcohol:  Never used alcohol. Daily Caffeine: Consumes on average 1 soda per day. Exercise Type: Walks  sporadically. Reviewed, no changes.   Date:2021  Was the patient queried about smoking

## 2021-05-06 LAB — HIV-1 AND HIV-2 ANTIBODIES: NORMAL

## 2021-05-07 LAB
HSV 1 GLYCOPROTEIN G AB IGG: 0.77 IV
HSV 2 GLYCOPROTEIN G AB IGG: 0.11 IV

## 2021-05-10 LAB
C. TRACHOMATIS DNA ,URINE: NEGATIVE
N. GONORRHOEAE DNA, URINE: NEGATIVE
SOURCE: NORMAL

## 2021-06-03 DIAGNOSIS — F41.9 ANXIETY: Primary | ICD-10-CM

## 2021-06-03 RX ORDER — ALPRAZOLAM 0.5 MG/1
0.25 TABLET ORAL NIGHTLY PRN
Qty: 30 TABLET | Refills: 1 | Status: SHIPPED | OUTPATIENT
Start: 2021-06-03 | End: 2021-07-03

## 2021-06-03 NOTE — TELEPHONE ENCOUNTER
Last Appointment:  5/5/2021  Future Appointments   Date Time Provider Chhaya Rodartei   11/17/2021  8:00 AM MD MARIANN Welch [facility]      Refill requested as this was not filled at last OV

## 2021-10-18 ENCOUNTER — OFFICE VISIT (OUTPATIENT)
Dept: FAMILY MEDICINE CLINIC | Age: 21
End: 2021-10-18
Payer: COMMERCIAL

## 2021-10-18 VITALS
DIASTOLIC BLOOD PRESSURE: 78 MMHG | BODY MASS INDEX: 37.02 KG/M2 | TEMPERATURE: 96.8 F | SYSTOLIC BLOOD PRESSURE: 118 MMHG | HEIGHT: 71 IN | OXYGEN SATURATION: 100 % | HEART RATE: 98 BPM | WEIGHT: 264.4 LBS | RESPIRATION RATE: 18 BRPM

## 2021-10-18 DIAGNOSIS — J03.00 ACUTE NON-RECURRENT STREPTOCOCCAL TONSILLITIS: Primary | ICD-10-CM

## 2021-10-18 LAB
Lab: NORMAL
PERFORMING INSTRUMENT: NORMAL
QC PASS/FAIL: NORMAL
S PYO AG THROAT QL: POSITIVE
SARS-COV-2, POC: NORMAL

## 2021-10-18 PROCEDURE — 99213 OFFICE O/P EST LOW 20 MIN: CPT | Performed by: NURSE PRACTITIONER

## 2021-10-18 PROCEDURE — 87880 STREP A ASSAY W/OPTIC: CPT | Performed by: NURSE PRACTITIONER

## 2021-10-18 PROCEDURE — 87426 SARSCOV CORONAVIRUS AG IA: CPT | Performed by: NURSE PRACTITIONER

## 2021-10-18 RX ORDER — AMOXICILLIN 500 MG/1
500 CAPSULE ORAL 2 TIMES DAILY
Qty: 20 CAPSULE | Refills: 0 | Status: SHIPPED | OUTPATIENT
Start: 2021-10-18 | End: 2021-10-28

## 2021-10-18 RX ORDER — METHYLPREDNISOLONE 4 MG/1
TABLET ORAL
Qty: 1 KIT | Refills: 0 | Status: SHIPPED
Start: 2021-10-18 | End: 2021-12-01

## 2021-10-18 NOTE — PROGRESS NOTES
Chief Complaint:   Pharyngitis (spots in the back of the throat, hard to swallow x 3 days ), Nasal Congestion (x 3 days ), and Emesis (x 3 days )    History of Present Illness   Source of history provided by:  patient. Faith Gaxiola is a 19 y.o. old male who presents to walk-in for sore throat. Pt states sore throat began 5 days ago. States they have nasal congestion, body aches, and occasional throwing up mucus. Denies any fever, chills, vomiting, abdominal pain, CP, SOB, cough, or lethargy. No sick contacts. Denies hx of COVID. Fully vaccinated for COVID with Aldona Raid. Exposed To: Streptococcus: no.                             Infectious Mononucleosis: no.      COVID-19: no.    Review of Systems   Unless otherwise stated in this report or unable to obtain because of the patient's clinical or mental status as evidenced by the medical record, this patients's positive and negative responses for Review of Systems, constitutional, psych, eyes, ENT, cardiovascular, respiratory, gastrointestinal, neurological, genitourinary, musculoskeletal, integument systems and systems related to the presenting problem are either stated in the preceding or were not pertinent or were negative for the symptoms and/or complaints related to the medical problem. Past Medical History:  has a past medical history of Ganglion cyst, Hyperlipidemia, Obesity, and Seasonal allergies. Past Surgical History:  has a past surgical history that includes cyst removal.  Social History:  reports that he has never smoked. He has never used smokeless tobacco. He reports that he does not drink alcohol. Family History: family history includes Diabetes in his mother; High Blood Pressure in his mother; High Cholesterol in his mother; Kidney stones in his father; Obesity in his mother; Other in his father. Allergies: Patient has no known allergies.     Physical Exam   Vital Signs:  /78   Pulse 98   Temp 96.8 °F (36 °C) (Temporal)   Resp 18   Ht 5' 11\" (1.803 m)   Wt 264 lb 6.4 oz (119.9 kg)   SpO2 100%   BMI 36.88 kg/m²    Oxygen Saturation Interpretation: Normal.    Constitutional:  Alert, development consistent with age. Ears:  TMs without perforation, injection, or bulging. External canals clear without exudate. Throat: Airway patent. Posterior pharynx with erythema and 1+ tonsillar hypertrophy. There is exudate noted on left tonsil. Neck:  Supple with good ROM. There is mild anterior bilateral adenopathy. Lungs:  Clear to auscultation and breath sounds equal.    CV: Regular rate and rhythm, normal heart sounds, without pathological murmurs, ectopy, gallops, or rubs. Abdomen:  Soft, nontender, good bowel sounds. No firm or pulsatile mass. No hepatosplenomegaly. Skin:  No rashes, erythema present. Lymphatics: No lymphangitis or adenopathy noted other then stated above. Neurological:  Alert and orientated. Motor functions intact. Responds to commands. Test Results Section   (All laboratory and radiology results have been personally reviewed by myself)  Labs:  Results for orders placed or performed in visit on 10/18/21   POCT rapid strep A   Result Value Ref Range    Strep A Ag Positive (A) None Detected   POCT COVID-19, Antigen   Result Value Ref Range    SARS-COV-2, POC Not-Detected Not Detected    Lot Number [id number]     QC Pass/Fail pass     Performing Instrument BD Veritor      Imaging: All Radiology results interpreted by Radiologist unless otherwise noted. No results found. Assessment / Plan   Impression(s):  Kathrin Arteaga was seen today for pharyngitis, nasal congestion and emesis. Diagnoses and all orders for this visit:    Acute non-recurrent streptococcal tonsillitis  -     POCT rapid strep A  -     POCT COVID-19, Antigen  -     amoxicillin (AMOXIL) 500 MG capsule; Take 1 capsule by mouth 2 times daily for 10 days  -     methylPREDNISolone (MEDROL DOSEPACK) 4 MG tablet;  Take by mouth. Rapid Covid came back negative in office, patient advised results. Rapid strep came back positive. Script written for Amoxicillin and Medrol dosepack, side effects discussed. Increase fluids and rest. NSAIDs prn pain/fever. F/u PCP in 5-7 days if symptoms persist. ED sooner if symptoms worsen or change. ED immediately with the development of refractory fever, shaking chills, dyspnea, dysphagia, neck stiffness, vomiting, etc. Pt is in agreement with this care plan. All questions answered. Return if symptoms worsen or fail to improve. Electronically signed by ZACHERY Washburn CNP   DD: 10/18/21    **This report was transcribed using voice recognition software. Every effort was made to ensure accuracy; however, inadvertent computerized transcription errors may be present.

## 2021-12-01 ENCOUNTER — OFFICE VISIT (OUTPATIENT)
Dept: FAMILY MEDICINE CLINIC | Age: 21
End: 2021-12-01
Payer: COMMERCIAL

## 2021-12-01 VITALS
BODY MASS INDEX: 35.54 KG/M2 | TEMPERATURE: 97.5 F | OXYGEN SATURATION: 97 % | DIASTOLIC BLOOD PRESSURE: 76 MMHG | WEIGHT: 254.8 LBS | SYSTOLIC BLOOD PRESSURE: 122 MMHG | HEART RATE: 92 BPM

## 2021-12-01 DIAGNOSIS — F41.9 ANXIETY: Primary | ICD-10-CM

## 2021-12-01 PROCEDURE — 99213 OFFICE O/P EST LOW 20 MIN: CPT | Performed by: STUDENT IN AN ORGANIZED HEALTH CARE EDUCATION/TRAINING PROGRAM

## 2021-12-01 RX ORDER — PSEUDOEPHEDRINE HCL 30 MG/5 ML
15 LIQUID (ML) ORAL 4 TIMES DAILY PRN
COMMUNITY
End: 2021-12-06

## 2021-12-01 RX ORDER — HYDROXYZINE HYDROCHLORIDE 25 MG/1
25 TABLET, FILM COATED ORAL EVERY 8 HOURS PRN
Qty: 30 TABLET | Refills: 0 | Status: SHIPPED
Start: 2021-12-01 | End: 2022-06-22 | Stop reason: SDUPTHER

## 2021-12-01 RX ORDER — CITALOPRAM 20 MG/1
20 TABLET ORAL DAILY
Qty: 90 TABLET | Refills: 1 | Status: SHIPPED
Start: 2021-12-01 | End: 2022-06-22 | Stop reason: SDUPTHER

## 2021-12-01 SDOH — ECONOMIC STABILITY: FOOD INSECURITY: WITHIN THE PAST 12 MONTHS, THE FOOD YOU BOUGHT JUST DIDN'T LAST AND YOU DIDN'T HAVE MONEY TO GET MORE.: NEVER TRUE

## 2021-12-01 SDOH — ECONOMIC STABILITY: FOOD INSECURITY: WITHIN THE PAST 12 MONTHS, YOU WORRIED THAT YOUR FOOD WOULD RUN OUT BEFORE YOU GOT MONEY TO BUY MORE.: NEVER TRUE

## 2021-12-01 ASSESSMENT — ENCOUNTER SYMPTOMS
VOMITING: 0
ABDOMINAL PAIN: 0
RHINORRHEA: 0
COUGH: 0
NAUSEA: 0
SHORTNESS OF BREATH: 0

## 2021-12-01 ASSESSMENT — SOCIAL DETERMINANTS OF HEALTH (SDOH): HOW HARD IS IT FOR YOU TO PAY FOR THE VERY BASICS LIKE FOOD, HOUSING, MEDICAL CARE, AND HEATING?: NOT HARD AT ALL

## 2021-12-01 NOTE — PROGRESS NOTES
[facility]  Office Progress Note  Dr. Louellen Dance      Patient:  David Pena 19 y.o. male     Date of Service: 12/1/21      Chief complaint:   Chief Complaint   Patient presents with    Depression     stopped celexa         History of Present Illness   The patient is a 19 y.o. male  here to follow up of their anxiety     Depression and anxiety  -had to quit school, so that helped with his anxiety a lot  -he still has some panic attacks at work. He feels like he has a hard time dealing with any small situation at work  -he is able to get through his work day  -thinks it may be affecting his job  -he states he doesn't do anything outside of work to avoid [employer]  -he frequently feels people are mad at him when they are not  -he took the celexa for some amount of months  -no SI/HI    Past Medical History:      Diagnosis Date    Ganglion cyst     Hyperlipidemia     Obesity     Seasonal allergies        Review of Systems:   Review of Systems   Constitutional: Negative for chills and fever. HENT: Negative for congestion and rhinorrhea. Respiratory: Negative for cough and shortness of breath. Cardiovascular: Negative for chest pain and leg swelling. Gastrointestinal: Negative for abdominal pain, nausea and vomiting. Genitourinary: Negative for dysuria and hematuria. Musculoskeletal: Negative for arthralgias and myalgias. Skin: Negative for rash and wound. Neurological: Negative for dizziness and light-headedness. Psychiatric/Behavioral: Negative for self-injury and suicidal ideas. The patient is nervous/anxious. Physical Exam   Vitals: /76   Pulse 92   Temp 97.5 °F (36.4 °C)   Wt 254 lb 12.8 oz (115.6 kg)   SpO2 97%   BMI 35.54 kg/m²   Physical Exam    General:  Well developed, well nourished, well groomed. No apparent distress. HEENT:  Normocephalic, atraumatic. No scleral icterus. No conjunctival injection. No nasal discharge.   Heart:  RRR, no murmurs, gallops, or rubs  Lungs:  CTA bilaterally, bilat symmetrical expansion, no wheeze, rales, or rhonchi  Abdomen: Bowel sounds present, soft, nontender, no masses, no organomegaly, no peritoneal signs  Extremities:  No clubbing, cyanosis, or edema  Neuro:  Alert and oriented x3, no focal deficits      Assessment and Plan       1. Anxiety  - Will re-start the celexa. Discussed it may take weeks for the medication to take effect. Can use atarax PRN in the mean time  - Would like him to follow up in 2-3 months. Let us know if he is not tolerating medication well in the mean time. - citalopram (CELEXA) 20 MG tablet; Take 1 tablet by mouth daily  Dispense: 90 tablet; Refill: 1  - hydrOXYzine (ATARAX) 25 MG tablet; Take 1 tablet by mouth every 8 hours as needed for Anxiety  Dispense: 30 tablet; Refill: 0      Counseled regarding above diagnosis, including possible risks and complications,  especially if left uncontrolled. Counseled regarding the possible side effects, risks, benefits and alternatives to treatment;patient and/or guardian verbalizes understanding, agrees, feels comfortable with and wishes to proceed with above treatment plan. Call or go to ED immediately if symptoms worsen or persist. Advised patient to call with any new medication issues, and, as applicable, read all Rx info from pharmacy to assure aware of all possible risks and side effects of medicationbefore taking. Patient and/or guardian given opportunity to ask questions/raise concerns. The patient verbalized comfort and understanding ofinstructions. Return to Office: Return in about 3 months (around 3/1/2022).     Medication List:    Current Outpatient Medications   Medication Sig Dispense Refill    pseudoephedrine (SUDAFED) 30 MG/5ML syrup Take 15 mg by mouth 4 times daily as needed      citalopram (CELEXA) 20 MG tablet Take 1 tablet by mouth daily 90 tablet 1    hydrOXYzine (ATARAX) 25 MG tablet Take 1 tablet by mouth every 8 hours as needed for Anxiety 30 tablet 0    fluticasone (FLONASE ALLERGY RELIEF) 50 MCG/ACT nasal spray 2 sprays by Each Nostril route as needed for Allergies 1 Bottle 5    fexofenadine (ALLEGRA ALLERGY) 180 MG tablet Take 180 mg by mouth daily      montelukast (SINGULAIR) 10 MG tablet Take 1 tablet by mouth daily (Patient not taking: Reported on 12/1/2021) 90 tablet 2     No current facility-administered medications for this visit. Venita Guido MD     This document may have been prepared at least partially through the use of voice recognition software. Although effort is taken to assure the accuracy ofthis document, it is possible that grammatical, syntax, or spelling errors may occur.

## 2021-12-06 ENCOUNTER — OFFICE VISIT (OUTPATIENT)
Dept: FAMILY MEDICINE CLINIC | Age: 21
End: 2021-12-06
Payer: COMMERCIAL

## 2021-12-06 VITALS
DIASTOLIC BLOOD PRESSURE: 78 MMHG | TEMPERATURE: 97.5 F | BODY MASS INDEX: 35.01 KG/M2 | OXYGEN SATURATION: 97 % | HEART RATE: 83 BPM | SYSTOLIC BLOOD PRESSURE: 116 MMHG | WEIGHT: 251 LBS

## 2021-12-06 DIAGNOSIS — R05.9 COUGH: Primary | ICD-10-CM

## 2021-12-06 DIAGNOSIS — Z20.822 SUSPECTED COVID-19 VIRUS INFECTION: ICD-10-CM

## 2021-12-06 PROCEDURE — 87804 INFLUENZA ASSAY W/OPTIC: CPT | Performed by: FAMILY MEDICINE

## 2021-12-06 PROCEDURE — 87426 SARSCOV CORONAVIRUS AG IA: CPT | Performed by: FAMILY MEDICINE

## 2021-12-06 PROCEDURE — 99214 OFFICE O/P EST MOD 30 MIN: CPT | Performed by: FAMILY MEDICINE

## 2021-12-06 RX ORDER — AMOXICILLIN AND CLAVULANATE POTASSIUM 875; 125 MG/1; MG/1
1 TABLET, FILM COATED ORAL 2 TIMES DAILY
Qty: 20 TABLET | Refills: 0 | Status: SHIPPED | OUTPATIENT
Start: 2021-12-06 | End: 2021-12-16

## 2021-12-06 NOTE — LETTER
[address] [address]  Phone: [phone]  Fax: [phone]    Aletha Tracy MD        December 6, 2021     Patient: Jenna Dutta   YOB: 2000   Date of Visit: 12/6/2021       To Whom It May Concern: It is my medical opinion that Jenna Dutta should remain out of work until 12/8/21. If you have any questions or concerns, please don't hesitate to call.     Sincerely,        Aletha Tracy MD

## 2021-12-06 NOTE — PROGRESS NOTES
Gala Klinefelter In    [place] presents to the office today for   Chief Complaint   Patient presents with    Cough     onset 1 week    Congestion     Cough  X 1 week  +nausea  No fever  Congestion  Headache  No loss of taste or smell  COVID vaccine  No flu shot yet this year    Review of Systems     /78 (Site: Right Upper Arm, Position: Sitting)   Pulse 83   Temp 97.5 °F (36.4 °C) (Temporal)   Wt 251 lb (113.9 kg)   SpO2 97%   BMI 35.01 kg/m²   Physical Exam  Constitutional:       Appearance: Normal appearance. HENT:      Head: Normocephalic and atraumatic. Nose: Congestion present. Mouth/Throat:      Mouth: Mucous membranes are moist.      Pharynx: Posterior oropharyngeal erythema present. No oropharyngeal exudate. Eyes:      Extraocular Movements: Extraocular movements intact. Conjunctiva/sclera: Conjunctivae normal.   Cardiovascular:      Rate and Rhythm: Normal rate. Heart sounds: Normal heart sounds. Pulmonary:      Effort: Pulmonary effort is normal.      Breath sounds: Normal breath sounds. Skin:     General: Skin is warm. Neurological:      Mental Status: He is alert and oriented to person, place, and time.    Psychiatric:         Mood and Affect: Mood normal.         Behavior: Behavior normal.            Current Outpatient Medications:     amoxicillin-clavulanate (AUGMENTIN) 875-125 MG per tablet, Take 1 tablet by mouth 2 times daily for 10 days, Disp: 20 tablet, Rfl: 0    citalopram (CELEXA) 20 MG tablet, Take 1 tablet by mouth daily, Disp: 90 tablet, Rfl: 1    hydrOXYzine (ATARAX) 25 MG tablet, Take 1 tablet by mouth every 8 hours as needed for Anxiety, Disp: 30 tablet, Rfl: 0    fluticasone (FLONASE ALLERGY RELIEF) 50 MCG/ACT nasal spray, 2 sprays by Each Nostril route as needed for Allergies, Disp: 1 Bottle, Rfl: 5    fexofenadine (ALLEGRA ALLERGY) 180 MG tablet, Take 180 mg by mouth daily, Disp: , Rfl:      Past Medical History:   Diagnosis Date  Ganglion cyst     Hyperlipidemia     Obesity     Seasonal allergies        Brennan Brennan was seen today for cough and congestion. Diagnoses and all orders for this visit:    Cough  -     amoxicillin-clavulanate (AUGMENTIN) 875-125 MG per tablet;  Take 1 tablet by mouth 2 times daily for 10 days    Suspected COVID-19 virus infection  -     POCT Influenza A/B  -     POCT COVID-19, Antigen       Rapid testing negative  Rest/fluids  Low risk COVID given vaccine and atypical symptoms  Ok to return to work in 2 days     Blake Hernandez MD

## 2022-06-22 DIAGNOSIS — F41.9 ANXIETY: ICD-10-CM

## 2022-06-22 RX ORDER — HYDROXYZINE HYDROCHLORIDE 25 MG/1
25 TABLET, FILM COATED ORAL EVERY 8 HOURS PRN
Qty: 30 TABLET | Refills: 0 | Status: SHIPPED | OUTPATIENT
Start: 2022-06-22 | End: 2022-07-02

## 2022-06-22 RX ORDER — CITALOPRAM 20 MG/1
20 TABLET ORAL DAILY
Qty: 90 TABLET | Refills: 1 | Status: SHIPPED
Start: 2022-06-22 | End: 2022-08-17 | Stop reason: ALTCHOICE

## 2022-08-17 ENCOUNTER — OFFICE VISIT (OUTPATIENT)
Dept: FAMILY MEDICINE CLINIC | Age: 22
End: 2022-08-17
Payer: COMMERCIAL

## 2022-08-17 VITALS
WEIGHT: 264 LBS | BODY MASS INDEX: 36.82 KG/M2 | TEMPERATURE: 98.3 F | DIASTOLIC BLOOD PRESSURE: 80 MMHG | SYSTOLIC BLOOD PRESSURE: 120 MMHG | HEART RATE: 86 BPM | OXYGEN SATURATION: 97 %

## 2022-08-17 DIAGNOSIS — E78.2 MIXED HYPERLIPIDEMIA: ICD-10-CM

## 2022-08-17 DIAGNOSIS — E66.09 CLASS 2 OBESITY DUE TO EXCESS CALORIES WITHOUT SERIOUS COMORBIDITY WITH BODY MASS INDEX (BMI) OF 35.0 TO 35.9 IN ADULT: Primary | ICD-10-CM

## 2022-08-17 DIAGNOSIS — E66.09 CLASS 2 OBESITY DUE TO EXCESS CALORIES WITHOUT SERIOUS COMORBIDITY WITH BODY MASS INDEX (BMI) OF 35.0 TO 35.9 IN ADULT: ICD-10-CM

## 2022-08-17 DIAGNOSIS — M79.672 PAIN IN BOTH FEET: ICD-10-CM

## 2022-08-17 DIAGNOSIS — M79.671 PAIN IN BOTH FEET: ICD-10-CM

## 2022-08-17 DIAGNOSIS — F41.9 ANXIETY: ICD-10-CM

## 2022-08-17 DIAGNOSIS — A64 STD (MALE): ICD-10-CM

## 2022-08-17 LAB
ALBUMIN SERPL-MCNC: 4.6 G/DL (ref 3.5–5.2)
ALP BLD-CCNC: 89 U/L (ref 40–129)
ALT SERPL-CCNC: 98 U/L (ref 0–40)
ANION GAP SERPL CALCULATED.3IONS-SCNC: 16 MMOL/L (ref 7–16)
AST SERPL-CCNC: 45 U/L (ref 0–39)
BILIRUB SERPL-MCNC: 0.2 MG/DL (ref 0–1.2)
BUN BLDV-MCNC: 15 MG/DL (ref 6–20)
CALCIUM SERPL-MCNC: 9.9 MG/DL (ref 8.6–10.2)
CHLORIDE BLD-SCNC: 104 MMOL/L (ref 98–107)
CHOLESTEROL, TOTAL: 224 MG/DL (ref 0–199)
CO2: 21 MMOL/L (ref 22–29)
CREAT SERPL-MCNC: 0.7 MG/DL (ref 0.7–1.2)
GFR AFRICAN AMERICAN: >60
GFR NON-AFRICAN AMERICAN: >60 ML/MIN/1.73
GLUCOSE BLD-MCNC: 95 MG/DL (ref 74–99)
HDLC SERPL-MCNC: 39 MG/DL
LDL CHOLESTEROL CALCULATED: 117 MG/DL (ref 0–99)
POTASSIUM SERPL-SCNC: 4.5 MMOL/L (ref 3.5–5)
SODIUM BLD-SCNC: 141 MMOL/L (ref 132–146)
TOTAL PROTEIN: 8.1 G/DL (ref 6.4–8.3)
TRIGL SERPL-MCNC: 341 MG/DL (ref 0–149)
VLDLC SERPL CALC-MCNC: 68 MG/DL

## 2022-08-17 PROCEDURE — 99213 OFFICE O/P EST LOW 20 MIN: CPT | Performed by: INTERNAL MEDICINE

## 2022-08-17 RX ORDER — CITALOPRAM 40 MG/1
40 TABLET ORAL DAILY
Qty: 30 TABLET | Refills: 5 | Status: SHIPPED | OUTPATIENT
Start: 2022-08-17

## 2022-08-17 ASSESSMENT — PATIENT HEALTH QUESTIONNAIRE - PHQ9
SUM OF ALL RESPONSES TO PHQ QUESTIONS 1-9: 12
6. FEELING BAD ABOUT YOURSELF - OR THAT YOU ARE A FAILURE OR HAVE LET YOURSELF OR YOUR FAMILY DOWN: 3
10. IF YOU CHECKED OFF ANY PROBLEMS, HOW DIFFICULT HAVE THESE PROBLEMS MADE IT FOR YOU TO DO YOUR WORK, TAKE CARE OF THINGS AT HOME, OR GET ALONG WITH OTHER PEOPLE: 1
4. FEELING TIRED OR HAVING LITTLE ENERGY: 0
SUM OF ALL RESPONSES TO PHQ QUESTIONS 1-9: 12
3. TROUBLE FALLING OR STAYING ASLEEP: 3
SUM OF ALL RESPONSES TO PHQ QUESTIONS 1-9: 12
SUM OF ALL RESPONSES TO PHQ QUESTIONS 1-9: 12
7. TROUBLE CONCENTRATING ON THINGS, SUCH AS READING THE NEWSPAPER OR WATCHING TELEVISION: 3
5. POOR APPETITE OR OVEREATING: 0
2. FEELING DOWN, DEPRESSED OR HOPELESS: 2
9. THOUGHTS THAT YOU WOULD BE BETTER OFF DEAD, OR OF HURTING YOURSELF: 0
8. MOVING OR SPEAKING SO SLOWLY THAT OTHER PEOPLE COULD HAVE NOTICED. OR THE OPPOSITE, BEING SO FIGETY OR RESTLESS THAT YOU HAVE BEEN MOVING AROUND A LOT MORE THAN USUAL: 0
1. LITTLE INTEREST OR PLEASURE IN DOING THINGS: 1
SUM OF ALL RESPONSES TO PHQ9 QUESTIONS 1 & 2: 3

## 2022-08-17 NOTE — PROGRESS NOTES
Is not symptomatic but he wants to be tested for HIV. Patient is very anxious and he has a job which has a lot of stressors. Patient is also complaining of foot pain. This is mostly on his right foot but also on his left. This involves the lateral aspect of his foot on the right but also appears to have some donation on both feet but more pronounced on the right. HPI:  ROS:  Const: General health stated as good. Eyes: Denies eye symptoms. ENMT: Reports itching of the ears. Reports allergies. CV: Denies cardiovascular symptoms. Resp: Denies respiratory symptoms. GI: Negative  : as per HPI  Musculo: Bilateral foot pain. Skin: Denies skin, hair and nail symptoms. Neuro: GANGLION CYSTS  Psych:Stress/anxiety. Worsened recently. Endocrine: Reports obesity. Hema/Lymph: Denies hematologic symptoms. Current Meds: Allegra Allergy 180 mg 1 by mouth every day  There are no diagnoses linked to this encounter. Allergies: NKDA  PMH:  Health Maintenance:  Physical Exam - 2022  Menactra - (3/25/2016)  Flu 10/28/2020  Medical Problems:  Seasonal Allergies Reffered to [facility] 2019  , Obesity, Ganglion Cysts, Hyperlipidemia  Anxiety-good response to Sertraline 2019  Surgical Hx:  Ganglion Cyst Removal - CATERINA-WRIST/TAILBONE  Reviewed and updated. FH:  Father:   --had hx of kidney stones, Glioblastoma Multiforme- Age 35. Mother:  Obesity, Non Insulin Dependent Diabetes, Hypertension, Hyperlipidemia. Reviewed, no changes. SH:  Marital: Single. Personal Habits: Cigarette Use: No Exposure To 2ND Hand Smoke, Does Not Smoke. Alcohol:  Never used alcohol. Daily Caffeine: Consumes on average 1 soda per day. Exercise Type: Walks  sporadically. Reviewed, no changes. Date 2022  Was the patient queried about smoking behavior? Yes No  Does the patient currently smoke? Smoking: Patient has never smoked.   Objective  Vitals:    22 1451   BP: 120/80   Pulse: 86   Temp: 98.3 °F (36.8 °C) SpO2: 97%     Exam:  Const: Appears healthy, well developed and well nourished. Appears obese. Eyes: EOMI in both eyes. PERRL. ENMT: External ears WNL. Tympanic membranes: decreased light reflex. External nose WNL. Neck: Supple and symmetric. Palpation reveals no adenopathy. No masses appreciated. Thyroid: no nodule appreciated or thyromegaly. No jugular venous distention. Resp: Respirations are unlabored. Respiration rate is normal. Auscultate good airflow. No rales,  rhonchi or wheezes appreciated over the lungs bilaterally. CV: Rhythm is regular. S1 is normal. S2 is normal. Carotids: no bruits. Abdominal aorta: not  palpable. Pedal pulses: 2+ and equal bilaterally. Extremities: No clubbing, cyanosis or edema. Abdomen: Bowel sounds are normoactive. Palpation reveals softness, with no distension,  organomegaly or tenderness. No abdominal masses palpable. No palpable hepatosplenomegaly. Musculo: Walks with a normal gait. Upper Extremities: ROM: Full ROM bilaterally. Slight discomfort with extension of the left wrist.  No evidence of synovial inflammation. Lower  Extremities: ROM: Full ROM bilaterally. Skin: Dry and warm with no rash. Neuro: Alert and oriented x3. Mood is normal. Affect is normal. Speech is articulate and  fluent. DTR's are symmetric, intact and 2+ bilaterally. Edgar Tapia was seen today for annual exam.    Diagnoses and all orders for this visit:    Class 2 obesity due to excess calories without serious comorbidity with body mass index (BMI) of 35.0 to 35.9 in adult  -     Comprehensive Metabolic Panel; Future  -     Lipid Panel; Future    Mixed hyperlipidemia  -     Comprehensive Metabolic Panel; Future  -     Lipid Panel; Future    Anxiety  -     Comprehensive Metabolic Panel; Future  -     Lipid Panel; Future    Pain in both feet  -     Neeta Moses DPM, Podiatry, [employer]  -     HIV Screen; Future    STD (male)  -     HIV Screen;  Future    Other orders  - citalopram (CELEXA) 40 MG tablet; Take 1 tablet by mouth daily  Patient has no suicidal or homicidal ideation. Most of the stressors seems to be related to work. The patient will be placed on citalopram 40 mg daily. I will reassess this in about 6 to 8 weeks. He would like to see Dr. Lawrence Rodríguez regarding his foot pain.

## 2022-08-18 LAB — HIV-1 AND HIV-2 ANTIBODIES: NORMAL

## 2022-11-01 ENCOUNTER — OFFICE VISIT (OUTPATIENT)
Dept: FAMILY MEDICINE CLINIC | Age: 22
End: 2022-11-01
Payer: COMMERCIAL

## 2022-11-01 VITALS
DIASTOLIC BLOOD PRESSURE: 80 MMHG | SYSTOLIC BLOOD PRESSURE: 140 MMHG | TEMPERATURE: 98.8 F | HEART RATE: 95 BPM | OXYGEN SATURATION: 97 %

## 2022-11-01 DIAGNOSIS — Z20.822 CLOSE EXPOSURE TO COVID-19 VIRUS: ICD-10-CM

## 2022-11-01 DIAGNOSIS — B34.9 ACUTE VIRAL SYNDROME: Primary | ICD-10-CM

## 2022-11-01 DIAGNOSIS — R05.9 COUGH, UNSPECIFIED TYPE: ICD-10-CM

## 2022-11-01 LAB
Lab: NORMAL
PERFORMING INSTRUMENT: NORMAL
QC PASS/FAIL: NORMAL
SARS-COV-2, POC: NORMAL

## 2022-11-01 PROCEDURE — 87426 SARSCOV CORONAVIRUS AG IA: CPT | Performed by: EMERGENCY MEDICINE

## 2022-11-01 PROCEDURE — 99213 OFFICE O/P EST LOW 20 MIN: CPT | Performed by: EMERGENCY MEDICINE

## 2022-11-01 RX ORDER — IBUPROFEN 800 MG/1
800 TABLET ORAL EVERY 8 HOURS PRN
Qty: 21 TABLET | Refills: 0 | Status: SHIPPED | OUTPATIENT
Start: 2022-11-01 | End: 2022-11-08

## 2022-11-01 RX ORDER — GUAIFENESIN AND DEXTROMETHORPHAN HYDROBROMIDE 600; 30 MG/1; MG/1
1 TABLET, EXTENDED RELEASE ORAL 2 TIMES DAILY
Qty: 28 TABLET | Refills: 0 | Status: SHIPPED | OUTPATIENT
Start: 2022-11-01

## 2022-11-01 ASSESSMENT — ENCOUNTER SYMPTOMS
SINUS PRESSURE: 0
SHORTNESS OF BREATH: 0
VOMITING: 0
DIARRHEA: 0
WHEEZING: 0
NAUSEA: 0
BACK PAIN: 0
ABDOMINAL PAIN: 0
SORE THROAT: 1
EYE REDNESS: 0
COUGH: 1
EYE PAIN: 0
EYE DISCHARGE: 0

## 2022-11-01 NOTE — PROGRESS NOTES
Chief Complaint:   No chief complaint on file. History of Present Illness   HPI:  Jacek Slater is a 23 y.o. male who presents to [facility] today for viral symptoms for 72 hours, close covid-19 exposure. Prior to Visit Medications    Medication Sig Taking? Authorizing Provider   Dextromethorphan-guaiFENesin (MUCINEX DM)  MG TB12 Take 1 tablet by mouth in the morning and at bedtime Yes May Alfaro DO   ibuprofen (IBU) 800 MG tablet Take 1 tablet by mouth every 8 hours as needed for Pain Yes José Alfaro, DO   citalopram (CELEXA) 40 MG tablet Take 1 tablet by mouth daily Yes Darvin Green MD   fluticasone (FLONASE ALLERGY RELIEF) 50 MCG/ACT nasal spray 2 sprays by Each Nostril route as needed for Allergies Yes Darvin Green MD   fexofenadine (ALLEGRA) 180 MG tablet Take 180 mg by mouth daily Yes Historical Provider, MD       Review of Systems   Review of Systems   Constitutional:  Positive for activity change, fatigue and fever. Negative for chills. HENT:  Positive for congestion and sore throat. Negative for ear pain and sinus pressure. Eyes:  Negative for pain, discharge and redness. Respiratory:  Positive for cough. Negative for shortness of breath and wheezing. Cardiovascular:  Negative for chest pain. Gastrointestinal:  Negative for abdominal pain, diarrhea, nausea and vomiting. Genitourinary:  Negative for dysuria and frequency. Musculoskeletal:  Positive for myalgias. Negative for arthralgias and back pain. Skin:  Negative for rash and wound. Neurological:  Negative for weakness and headaches. Hematological:  Negative for adenopathy. Psychiatric/Behavioral: Negative. All other systems reviewed and are negative. Patient's medical, social, and family history reviewed    Past Medical History:  has a past medical history of Ganglion cyst, Hyperlipidemia, Obesity, and Seasonal allergies.    Past Surgical History:  has a past surgical history that includes cyst removal.  Social History:  reports that he has never smoked. He has never used smokeless tobacco. He reports that he does not drink alcohol. Family History: family history includes Diabetes in his mother; High Blood Pressure in his mother; High Cholesterol in his mother; Kidney stones in his father; Obesity in his mother; Other in his father. Allergies: Patient has no known allergies. Physical Exam   Vital Signs:  BP (!) 140/80   Pulse 95   Temp 98.8 °F (37.1 °C)   SpO2 97%    Oxygen Saturation Interpretation: Normal.    Physical Exam  Vitals and nursing note reviewed. Constitutional:       Appearance: He is well-developed. HENT:      Head: Normocephalic and atraumatic. Right Ear: Tympanic membrane normal.      Left Ear: Tympanic membrane normal.      Nose: Congestion and rhinorrhea present. Mouth/Throat:      Pharynx: Posterior oropharyngeal erythema present. Eyes:      Pupils: Pupils are equal, round, and reactive to light. Cardiovascular:      Rate and Rhythm: Regular rhythm. Tachycardia present. Heart sounds: Normal heart sounds. No murmur heard. Pulmonary:      Effort: Pulmonary effort is normal. No respiratory distress. Breath sounds: Normal breath sounds. No wheezing or rales. Abdominal:      General: Bowel sounds are normal.      Palpations: Abdomen is soft. Tenderness: There is no abdominal tenderness. There is no guarding or rebound. Musculoskeletal:      Cervical back: Normal range of motion and neck supple. Skin:     General: Skin is warm and dry. Neurological:      Mental Status: He is alert and oriented to person, place, and time. Cranial Nerves: No cranial nerve deficit.       Coordination: Coordination normal.       Test Results Section   (All laboratory and radiology results have been personally reviewed by myself)  Labs:  Results for orders placed or performed in visit on 11/01/22   POCT COVID-19, Antigen   Result Value Ref Range SARS-COV-2, POC Not-Detected Not Detected    Lot Number [id number]     QC Pass/Fail pass     Performing Instrument BD Veritor         Imaging: All Radiology results interpreted by Radiologist unless otherwise noted. No results found. Assessment / Plan   Impression(s):  Diagnoses and all orders for this visit:    Acute viral syndrome  -     Dextromethorphan-guaiFENesin (MUCINEX DM)  MG TB12; Take 1 tablet by mouth in the morning and at bedtime  -     ibuprofen (IBU) 800 MG tablet; Take 1 tablet by mouth every 8 hours as needed for Pain    Close exposure to COVID-19 virus  -     Dextromethorphan-guaiFENesin (MUCINEX DM)  MG TB12; Take 1 tablet by mouth in the morning and at bedtime  -     ibuprofen (IBU) 800 MG tablet; Take 1 tablet by mouth every 8 hours as needed for Pain    Cough, unspecified type  -     POCT COVID-19, Antigen  -     Dextromethorphan-guaiFENesin (MUCINEX DM)  MG TB12; Take 1 tablet by mouth in the morning and at bedtime  -     ibuprofen (IBU) 800 MG tablet; Take 1 tablet by mouth every 8 hours as needed for Pain      Discharged home. Patient condition is good    Repeat COVID19 Ag test in 48 hours, Quarantine and mask pending test #2.     New Medications     New Prescriptions    DEXTROMETHORPHAN-GUAIFENESIN (MUCINEX DM)  MG TB12    Take 1 tablet by mouth in the morning and at bedtime    IBUPROFEN (IBU) 800 MG TABLET    Take 1 tablet by mouth every 8 hours as needed for Pain       Electronically signed by Kim Longoria DO   DD: 11/1/22

## 2022-12-20 ENCOUNTER — OFFICE VISIT (OUTPATIENT)
Dept: FAMILY MEDICINE CLINIC | Age: 22
End: 2022-12-20
Payer: COMMERCIAL

## 2022-12-20 VITALS
RESPIRATION RATE: 16 BRPM | BODY MASS INDEX: 36.65 KG/M2 | TEMPERATURE: 97.1 F | WEIGHT: 256 LBS | HEART RATE: 74 BPM | DIASTOLIC BLOOD PRESSURE: 80 MMHG | SYSTOLIC BLOOD PRESSURE: 130 MMHG | HEIGHT: 70 IN | OXYGEN SATURATION: 98 %

## 2022-12-20 DIAGNOSIS — R31.9 HEMATURIA, UNSPECIFIED TYPE: ICD-10-CM

## 2022-12-20 DIAGNOSIS — R31.9 HEMATURIA, UNSPECIFIED TYPE: Primary | ICD-10-CM

## 2022-12-20 LAB
APPEARANCE FLUID: NORMAL
BILIRUBIN, POC: NORMAL
BLOOD URINE, POC: NORMAL
CLARITY, POC: CLEAR
COLOR, POC: YELLOW
GLUCOSE URINE, POC: NORMAL
KETONES, POC: NORMAL
LEUKOCYTE EST, POC: NORMAL
NITRITE, POC: NORMAL
PH, POC: 6.5
PROTEIN, POC: NORMAL
SPECIFIC GRAVITY, POC: 1.02
UROBILINOGEN, POC: 1

## 2022-12-20 PROCEDURE — 81002 URINALYSIS NONAUTO W/O SCOPE: CPT | Performed by: FAMILY MEDICINE

## 2022-12-20 PROCEDURE — 99214 OFFICE O/P EST MOD 30 MIN: CPT | Performed by: FAMILY MEDICINE

## 2022-12-20 RX ORDER — CIPROFLOXACIN 500 MG/1
500 TABLET, FILM COATED ORAL 2 TIMES DAILY
Qty: 14 TABLET | Refills: 0 | Status: SHIPPED | OUTPATIENT
Start: 2022-12-20 | End: 2022-12-27

## 2022-12-20 ASSESSMENT — ENCOUNTER SYMPTOMS
CHEST TIGHTNESS: 0
COUGH: 0
VOMITING: 0
ALLERGIC/IMMUNOLOGIC NEGATIVE: 1
SHORTNESS OF BREATH: 0
TROUBLE SWALLOWING: 0
SINUS PAIN: 0
EYE PAIN: 0
BACK PAIN: 1
SORE THROAT: 0
NAUSEA: 0
EYE REDNESS: 0
ABDOMINAL PAIN: 1
PHOTOPHOBIA: 0
EYE DISCHARGE: 0
BLOOD IN STOOL: 0
DIARRHEA: 0

## 2022-12-20 NOTE — PROGRESS NOTES
22  Walt Martinez : 2000 Sex: male  Age: 23 y.o. Assessment and Plan:  Jung Wadsworth was seen today for nausea & vomiting, lower back pain and hematuria. Diagnoses and all orders for this visit:    Hematuria, unspecified type  -     POCT Urinalysis no Micro  -     Culture, Urine; Future  -     XR ABDOMEN (KUB) (SINGLE AP VIEW); Future  -     ciprofloxacin (CIPRO) 500 MG tablet; Take 1 tablet by mouth 2 times daily for 7 days    X-ray looks negative by my reading for radiopaque stones, final disposition per radiology report. Appears to be urinary tract infection, we will go ahead and get appropriate cultures and empirically treat with Cipro for the next 7 days. Antibiotic will be adjusted pending culture reports. His complaints not improve, or worsen in any way, he should present back to the office. Return in about 2 weeks (around 1/3/2023), or Recheck with Dr. Tanner Xavier. Chief Complaint   Patient presents with    Nausea & Vomiting          Lower Back Pain    Hematuria       The patient states that they have had dysuria and urinary frequency for the last 3 days. The patient does admit to suprapubic pressure. The patient has had gross hematuria. The patient denies any abdominal or flank pain. The patient admitted nausea and vomiting over the weekend, this is now resolved. .  No fevers of chills. No prior history of kidney stones. The patient is any new partners, or recent contacts. Review of Systems   Constitutional:  Negative for appetite change, fatigue and unexpected weight change. HENT:  Negative for congestion, ear pain, hearing loss, sinus pain, sore throat and trouble swallowing. Eyes:  Negative for photophobia, pain, discharge and redness. Respiratory:  Negative for cough, chest tightness and shortness of breath. Cardiovascular:  Negative for chest pain, palpitations and leg swelling. Gastrointestinal:  Positive for abdominal pain.  Negative for blood in stool, diarrhea, nausea and vomiting. Endocrine: Negative. Genitourinary:  Negative for dysuria, flank pain, frequency and hematuria. Musculoskeletal:  Positive for back pain. Negative for arthralgias, joint swelling and myalgias. Skin: Negative. Allergic/Immunologic: Negative. Neurological:  Negative for dizziness, seizures, syncope, weakness, light-headedness, numbness and headaches. Hematological:  Negative for adenopathy. Does not bruise/bleed easily. Psychiatric/Behavioral: Negative.          Current Outpatient Medications:     ciprofloxacin (CIPRO) 500 MG tablet, Take 1 tablet by mouth 2 times daily for 7 days, Disp: 14 tablet, Rfl: 0    Dextromethorphan-guaiFENesin (MUCINEX DM)  MG TB12, Take 1 tablet by mouth in the morning and at bedtime, Disp: 28 tablet, Rfl: 0    ibuprofen (IBU) 800 MG tablet, Take 1 tablet by mouth every 8 hours as needed for Pain, Disp: 21 tablet, Rfl: 0    citalopram (CELEXA) 40 MG tablet, Take 1 tablet by mouth daily, Disp: 30 tablet, Rfl: 5    fluticasone (FLONASE ALLERGY RELIEF) 50 MCG/ACT nasal spray, 2 sprays by Each Nostril route as needed for Allergies, Disp: 1 Bottle, Rfl: 5    fexofenadine (ALLEGRA) 180 MG tablet, Take 180 mg by mouth daily, Disp: , Rfl:   Allergies   Allergen Reactions    Benadryl [Diphenhydramine]        Past Medical History:   Diagnosis Date    Ganglion cyst     Hyperlipidemia     Obesity     Seasonal allergies      Past Surgical History:   Procedure Laterality Date    CYST REMOVAL      CATERINA-WRIST/TAILBONE     Family History   Problem Relation Age of Onset    Obesity Mother     Diabetes Mother         Non Insulin Dependent Diabetes    High Blood Pressure Mother     High Cholesterol Mother     Kidney stones Father     Other Father         Gliboblastoma Multiforme     Social History     Socioeconomic History    Marital status: Single     Spouse name: Not on file    Number of children: Not on file    Years of education: Not on file Highest education level: Not on file   Occupational History    Not on file   Tobacco Use    Smoking status: Never    Smokeless tobacco: Never   Substance and Sexual Activity    Alcohol use: Never    Drug use: Not on file    Sexual activity: Not on file   Other Topics Concern    Not on file   Social History Narrative    Not on file     Social Determinants of Health     Financial Resource Strain: Not on file   Food Insecurity: Not on file   Transportation Needs: Not on file   Physical Activity: Not on file   Stress: Not on file   Social Connections: Not on file   Intimate Partner Violence: Not on file   Housing Stability: Not on file       Vitals:    12/20/22 0857   BP: 130/80   Pulse: 74   Resp: 16   Temp: 97.1 °F (36.2 °C)   TempSrc: Temporal   SpO2: 98%   Weight: 256 lb (116.1 kg)   Height: 5' 10\" (1.778 m)       Physical Exam  Vitals and nursing note reviewed. Constitutional:       Appearance: He is well-developed. HENT:      Head: Atraumatic. Right Ear: External ear normal.      Left Ear: External ear normal.      Nose: Nose normal.      Mouth/Throat:      Pharynx: No oropharyngeal exudate. Eyes:      Conjunctiva/sclera: Conjunctivae normal.      Pupils: Pupils are equal, round, and reactive to light. Neck:      Thyroid: No thyromegaly. Trachea: No tracheal deviation. Cardiovascular:      Rate and Rhythm: Normal rate and regular rhythm. Heart sounds: No murmur heard. No friction rub. No gallop. Pulmonary:      Effort: Pulmonary effort is normal. No respiratory distress. Breath sounds: Normal breath sounds. Abdominal:      General: Bowel sounds are normal.      Palpations: Abdomen is soft. Musculoskeletal:         General: No tenderness or deformity. Normal range of motion. Cervical back: Normal range of motion and neck supple. Lymphadenopathy:      Cervical: No cervical adenopathy. Skin:     General: Skin is warm and dry.       Capillary Refill: Capillary refill takes less than 2 seconds. Findings: No rash. Neurological:      Mental Status: He is alert and oriented to person, place, and time. Sensory: No sensory deficit. Motor: No abnormal muscle tone.       Coordination: Coordination normal.      Deep Tendon Reflexes: Reflexes normal.           Seen By:  George Azevedo DO

## 2022-12-22 LAB
C. TRACHOMATIS DNA ,URINE: NEGATIVE
N. GONORRHOEAE DNA, URINE: NEGATIVE
SOURCE: NORMAL

## 2022-12-23 LAB
ORGANISM: ABNORMAL
URINE CULTURE, ROUTINE: ABNORMAL

## 2023-02-21 ENCOUNTER — OFFICE VISIT (OUTPATIENT)
Dept: FAMILY MEDICINE CLINIC | Age: 23
End: 2023-02-21
Payer: COMMERCIAL

## 2023-02-21 VITALS
OXYGEN SATURATION: 97 % | TEMPERATURE: 97.5 F | BODY MASS INDEX: 37.45 KG/M2 | WEIGHT: 261 LBS | SYSTOLIC BLOOD PRESSURE: 120 MMHG | HEART RATE: 77 BPM | DIASTOLIC BLOOD PRESSURE: 80 MMHG

## 2023-02-21 DIAGNOSIS — Z87.448 HX OF HEMATURIA: ICD-10-CM

## 2023-02-21 DIAGNOSIS — E78.2 MIXED HYPERLIPIDEMIA: ICD-10-CM

## 2023-02-21 DIAGNOSIS — F41.9 ANXIETY: ICD-10-CM

## 2023-02-21 DIAGNOSIS — F41.9 ANXIETY: Primary | ICD-10-CM

## 2023-02-21 LAB
ALBUMIN SERPL-MCNC: 4.3 G/DL (ref 3.5–5.2)
ALP BLD-CCNC: 81 U/L (ref 40–129)
ALT SERPL-CCNC: 99 U/L (ref 0–40)
ANION GAP SERPL CALCULATED.3IONS-SCNC: 12 MMOL/L (ref 7–16)
AST SERPL-CCNC: 53 U/L (ref 0–39)
BILIRUB SERPL-MCNC: 0.4 MG/DL (ref 0–1.2)
BILIRUBIN URINE: NEGATIVE
BLOOD, URINE: NEGATIVE
BUN BLDV-MCNC: 11 MG/DL (ref 6–20)
CALCIUM SERPL-MCNC: 9.3 MG/DL (ref 8.6–10.2)
CHLORIDE BLD-SCNC: 104 MMOL/L (ref 98–107)
CHOLESTEROL, TOTAL: 227 MG/DL (ref 0–199)
CLARITY: CLEAR
CO2: 23 MMOL/L (ref 22–29)
COLOR: YELLOW
CREAT SERPL-MCNC: 0.7 MG/DL (ref 0.7–1.2)
GFR SERPL CREATININE-BSD FRML MDRD: >60 ML/MIN/1.73
GLUCOSE BLD-MCNC: 92 MG/DL (ref 74–99)
GLUCOSE URINE: NEGATIVE MG/DL
HDLC SERPL-MCNC: 43 MG/DL
KETONES, URINE: NEGATIVE MG/DL
LDL CHOLESTEROL CALCULATED: 163 MG/DL (ref 0–99)
LEUKOCYTE ESTERASE, URINE: NEGATIVE
NITRITE, URINE: NEGATIVE
PH UA: 7 (ref 5–9)
POTASSIUM SERPL-SCNC: 4.6 MMOL/L (ref 3.5–5)
PROTEIN UA: NEGATIVE MG/DL
SODIUM BLD-SCNC: 139 MMOL/L (ref 132–146)
SPECIFIC GRAVITY UA: 1.02 (ref 1–1.03)
TOTAL PROTEIN: 7.8 G/DL (ref 6.4–8.3)
TRIGL SERPL-MCNC: 104 MG/DL (ref 0–149)
UROBILINOGEN, URINE: 0.2 E.U./DL
VLDLC SERPL CALC-MCNC: 21 MG/DL

## 2023-02-21 PROCEDURE — 99214 OFFICE O/P EST MOD 30 MIN: CPT | Performed by: INTERNAL MEDICINE

## 2023-02-21 PROCEDURE — 81003 URINALYSIS AUTO W/O SCOPE: CPT | Performed by: INTERNAL MEDICINE

## 2023-02-21 RX ORDER — CITALOPRAM 40 MG/1
40 TABLET ORAL DAILY
Qty: 30 TABLET | Refills: 5 | Status: SHIPPED | OUTPATIENT
Start: 2023-02-21

## 2023-02-21 NOTE — PROGRESS NOTES
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  This is quite an achievement but also causes a fair amount of anxiety.  The patient states citalopram is still working quite well.  He is using Flonase and Allegra for his allergies which seem to be stable.  The patient does have sex with men and we did discuss prophylaxis with prep along with possible immunization with hep A and hep B vaccinations.  The patient is to consider these options and then I will follow-up quickly.  He does have a history of a urinary tract infection back in December and was treated with antibiotic.  Symptoms have resolved.  He did have a large amount of blood in the urine and he has not noticed any suprapubic or back pain nor any blood in his urine.  HPI:  ROS:  Const: General health stated as good.  Eyes: Denies eye symptoms.  ENMT: Reports itching of the ears. Reports allergies.  CV: Denies cardiovascular symptoms.  Resp: Denies respiratory symptoms.  GI: Negative  : as per HPI  Musculo: Bilateral foot pain.  Skin: Denies skin, hair and nail symptoms.  Neuro: GANGLION CYSTS  Psych:Stress/anxiety.  Seems stable with citalopram.  Endocrine: Reports obesity.  Hema/Lymph: Denies hematologic symptoms.  Current Meds: Allegra Allergy 180 mg 1 by mouth every day  Allergies: NKDA  PMH:  Health Maintenance:  Physical Exam - 2023  Menactra - (3/25/2016)  Flu 10/28/2020  Medical Problems:  Seasonal Allergies Reffered to [facility] 2019  , Obesity, Ganglion Cysts, Hyperlipidemia  Anxiety-good response to Sertraline 2019  Surgical Hx:  Ganglion Cyst Removal - CATERINA-WRIST/TAILBONE  Reviewed and updated.  FH:  Father:   --had hx of kidney stones, Glioblastoma Multiforme- Age 33.  Mother:  Obesity, Non Insulin Dependent Diabetes, Hypertension, Hyperlipidemia.  Reviewed, no changes.  SH:  Marital: Single.  Personal Habits: Cigarette Use: No Exposure To 2ND Hand Smoke, Does Not Smoke.Alcohol:  Never used alcohol.Daily  Caffeine: Consumes on average 1 soda per day. Exercise Type: Walks  sporadically. Reviewed, no changes. Date 2/21/2023  Was the patient queried about smoking behavior? Yes No  Does the patient currently smoke? Smoking: Patient has never smoked. Objective  Vitals:    02/21/23 1105   BP: 120/80   Pulse: 77   Temp: 97.5 °F (36.4 °C)   SpO2: 97%     Exam:  Const: Appears healthy, well developed and well nourished. Appears obese. Eyes: EOMI in both eyes. PERRL. ENMT: External ears WNL. Tympanic membranes: decreased light reflex. External nose WNL. Neck: Supple and symmetric. Palpation reveals no adenopathy. No masses appreciated. Thyroid: no nodule appreciated or thyromegaly. No jugular venous distention. Resp: Respirations are unlabored. Respiration rate is normal. Auscultate good airflow. No rales,  rhonchi or wheezes appreciated over the lungs bilaterally. CV: Rhythm is regular. S1 is normal. S2 is normal. Carotids: no bruits. Abdominal aorta: not  palpable. Pedal pulses: 2+ and equal bilaterally. Extremities: No clubbing, cyanosis or edema. Abdomen: Bowel sounds are normoactive. Palpation reveals softness, with no distension,  organomegaly or tenderness. No abdominal masses palpable. No palpable hepatosplenomegaly. Musculo: Walks with a normal gait. Upper Extremities: ROM: Full ROM bilaterally. Slight discomfort with extension of the left wrist.  No evidence of synovial inflammation. Lower  Extremities: ROM: Full ROM bilaterally. Skin: Dry and warm with no rash. Neuro: Alert and oriented x3. Mood is normal. Affect is normal. Speech is articulate and  fluent. DTR's are symmetric, intact and 2+ bilaterally. Kimberlee Gardner was seen today for follow-up. Diagnoses and all orders for this visit:    Anxiety  -     Comprehensive Metabolic Panel; Future  -     Urinalysis; Future  -     Lipid Panel; Future    Mixed hyperlipidemia  -     Comprehensive Metabolic Panel; Future  -     Urinalysis;  Future  -     Lipid Panel; Future    Hx of hematuria    Other orders  -     citalopram (CELEXA) 40 MG tablet; Take 1 tablet by mouth daily  Patient is to consider PRE P. Advice regarding hepatitis A and B vaccination. Continue escitalopram.  Check urinalysis to make sure hematuria is not present. If it is he will need urologic evaluation. I will see the patient back in 1 month.   Of asked him to check what is involved with [facility].

## 2023-02-22 ENCOUNTER — TELEPHONE (OUTPATIENT)
Dept: FAMILY MEDICINE CLINIC | Age: 23
End: 2023-02-22

## 2023-02-22 DIAGNOSIS — R79.89 ELEVATED LFTS: Primary | ICD-10-CM

## 2023-02-22 NOTE — TELEPHONE ENCOUNTER
----- Message from Lynn Gomez MD sent at 2/22/2023  4:36 AM EST -----  There is no evidence of blood in the urine. Kidney function looks stable but liver function remains elevated. Blood sugar is okay. I have ordered a liver ultrasound to assess the liver abnormalities.   (I placed the order in the chart)

## 2023-03-29 ENCOUNTER — OFFICE VISIT (OUTPATIENT)
Dept: FAMILY MEDICINE CLINIC | Age: 23
End: 2023-03-29
Payer: COMMERCIAL

## 2023-03-29 VITALS
BODY MASS INDEX: 38.31 KG/M2 | TEMPERATURE: 97.3 F | DIASTOLIC BLOOD PRESSURE: 80 MMHG | WEIGHT: 267 LBS | HEART RATE: 84 BPM | SYSTOLIC BLOOD PRESSURE: 120 MMHG | OXYGEN SATURATION: 98 %

## 2023-03-29 DIAGNOSIS — J03.00 STREP TONSILLITIS: ICD-10-CM

## 2023-03-29 DIAGNOSIS — J02.9 SORE THROAT: ICD-10-CM

## 2023-03-29 DIAGNOSIS — R31.0 GROSS HEMATURIA: ICD-10-CM

## 2023-03-29 DIAGNOSIS — J30.9 ALLERGIC RHINITIS, UNSPECIFIED SEASONALITY, UNSPECIFIED TRIGGER: ICD-10-CM

## 2023-03-29 DIAGNOSIS — F32.9 REACTIVE DEPRESSION: ICD-10-CM

## 2023-03-29 DIAGNOSIS — A54.5 PHARYNGITIS, GONOCOCCAL: ICD-10-CM

## 2023-03-29 DIAGNOSIS — E66.01 SEVERE OBESITY (BMI 35.0-39.9) WITH COMORBIDITY (HCC): Primary | ICD-10-CM

## 2023-03-29 LAB — S PYO AG THROAT QL: NORMAL

## 2023-03-29 PROCEDURE — 99214 OFFICE O/P EST MOD 30 MIN: CPT | Performed by: INTERNAL MEDICINE

## 2023-03-29 PROCEDURE — 87880 STREP A ASSAY W/OPTIC: CPT | Performed by: INTERNAL MEDICINE

## 2023-03-29 RX ORDER — CEFTRIAXONE 1 G/1
1000 INJECTION, POWDER, FOR SOLUTION INTRAMUSCULAR; INTRAVENOUS ONCE
Status: COMPLETED | OUTPATIENT
Start: 2023-03-29 | End: 2023-03-30

## 2023-03-29 RX ORDER — DOXYCYCLINE HYCLATE 100 MG
100 TABLET ORAL 2 TIMES DAILY
Qty: 20 TABLET | Refills: 0 | Status: SHIPPED | OUTPATIENT
Start: 2023-03-29 | End: 2023-04-08

## 2023-03-29 RX ORDER — AMOXICILLIN AND CLAVULANATE POTASSIUM 875; 125 MG/1; MG/1
1 TABLET, FILM COATED ORAL 2 TIMES DAILY
Qty: 14 TABLET | Refills: 0 | Status: SHIPPED
Start: 2023-03-29 | End: 2023-03-29 | Stop reason: ALTCHOICE

## 2023-03-29 NOTE — PROGRESS NOTES
Patient noted gross hematuria over the weekend. He has not had subsequent gross hematuria but that this is now the second episode of having this. Patient is sexually active and has oral sex but not anal sex. Patient has developed what appears to be a pharyngitis/tonsillitis. He does have anterior cervical adenopathy but no posterior cervical adenopathy. Anterior cervical adenopathy is only on the right. He denies fever, chills, sweats. He did have oral sex last week. Patient does not wish to proceed with prep but we did discuss this once again. Initially the strep was read as positive but after further review this was negative. Initially was placed on Augmentin but after the testing came back it was decided that this could be gonococcal pharyngitis and arrangements were made for further testing and antibiotics were changed. ROS:  Const: General health stated as good. Eyes: Denies eye symptoms. ENMT: Pharyngitis/tonsillitis  CV: Denies cardiovascular symptoms. Resp: Denies respiratory symptoms. GI: Negative  : as per HPI  Musculo: Bilateral foot pain. Skin: Denies skin, hair and nail symptoms. Neuro: GANGLION CYSTS  Psych:Stress/anxiety. Seems stable with citalopram.  Endocrine: Reports obesity. Hema/Lymph: Denies hematologic symptoms. Current Meds: Allegra Allergy 180 mg 1 by mouth every day  Allergies: NKDA  PMH:  Health Maintenance:  Physical Exam - 2023  Menactra - (3/25/2016)  Flu 10/28/2020  Medical Problems:  Seasonal Allergies Reffered to [facility] 2019  , Obesity, Ganglion Cysts, Hyperlipidemia  Anxiety-good response to Sertraline 2019  Surgical Hx:  Ganglion Cyst Removal - CATERINA-WRIST/TAILBONE  Reviewed and updated. FH:  Father:   --had hx of kidney stones, Glioblastoma Multiforme- Age 35. Mother:  Obesity, Non Insulin Dependent Diabetes, Hypertension, Hyperlipidemia. Reviewed, no changes. SH:  Marital: Single.   Personal Habits: Cigarette Use: No Exposure

## 2023-03-30 ENCOUNTER — NURSE ONLY (OUTPATIENT)
Dept: FAMILY MEDICINE CLINIC | Age: 23
End: 2023-03-30

## 2023-03-30 DIAGNOSIS — A54.5 PHARYNGITIS, GONOCOCCAL: Primary | ICD-10-CM

## 2023-03-30 DIAGNOSIS — A54.5 PHARYNGITIS, GONOCOCCAL: ICD-10-CM

## 2023-03-30 RX ADMIN — CEFTRIAXONE 1000 MG: 1 INJECTION, POWDER, FOR SOLUTION INTRAMUSCULAR; INTRAVENOUS at 08:05

## 2023-03-31 LAB — RPR SER QL: NORMAL

## 2023-04-02 LAB — ORGANISM: ABNORMAL

## 2023-04-03 LAB
CHLAMYDIA DNA UR QL NAA+PROBE: NEGATIVE
N GONORRHOEA DNA UR QL NAA+PROBE: NEGATIVE
SPECIMEN SOURCE: NORMAL

## 2023-04-05 LAB
HIV INTERPRETATION: NEGATIVE
HIV-1 ANTIBODY: NEGATIVE
HIV-2 AB: NEGATIVE

## 2023-07-29 PROCEDURE — 99283 EMERGENCY DEPT VISIT LOW MDM: CPT

## 2023-07-29 ASSESSMENT — PAIN DESCRIPTION - LOCATION: LOCATION: ABDOMEN

## 2023-07-29 ASSESSMENT — LIFESTYLE VARIABLES
HOW MANY STANDARD DRINKS CONTAINING ALCOHOL DO YOU HAVE ON A TYPICAL DAY: 1 OR 2
HOW OFTEN DO YOU HAVE A DRINK CONTAINING ALCOHOL: MONTHLY OR LESS

## 2023-07-29 ASSESSMENT — PAIN SCALES - GENERAL
PAINLEVEL_OUTOF10: 5
PAINLEVEL_OUTOF10: 5

## 2023-07-30 ENCOUNTER — HOSPITAL ENCOUNTER (EMERGENCY)
Age: 23
Discharge: HOME | End: 2023-07-30
Attending: EMERGENCY MEDICINE
Payer: COMMERCIAL

## 2023-07-30 VITALS
RESPIRATION RATE: 15 BRPM | BODY MASS INDEX: 37.8 KG/M2 | SYSTOLIC BLOOD PRESSURE: 136 MMHG | WEIGHT: 270 LBS | DIASTOLIC BLOOD PRESSURE: 85 MMHG | HEIGHT: 71 IN | HEART RATE: 97 BPM | OXYGEN SATURATION: 100 % | TEMPERATURE: 97.9 F

## 2023-07-30 DIAGNOSIS — T78.40XA ALLERGIC REACTION, INITIAL ENCOUNTER: Primary | ICD-10-CM

## 2023-07-30 PROCEDURE — 6370000000 HC RX 637 (ALT 250 FOR IP): Performed by: EMERGENCY MEDICINE

## 2023-07-30 RX ORDER — METHYLPREDNISOLONE 4 MG/1
TABLET ORAL
Qty: 1 KIT | Refills: 0 | Status: SHIPPED | OUTPATIENT
Start: 2023-07-30 | End: 2023-08-05

## 2023-07-30 RX ORDER — PREDNISONE 20 MG/1
40 TABLET ORAL ONCE
Status: COMPLETED | OUTPATIENT
Start: 2023-07-30 | End: 2023-07-30

## 2023-07-30 RX ORDER — EPINEPHRINE 0.3 MG/.3ML
0.3 INJECTION SUBCUTANEOUS ONCE
Qty: 1 EACH | Refills: 0 | Status: SHIPPED | OUTPATIENT
Start: 2023-07-30 | End: 2023-07-30

## 2023-07-30 RX ADMIN — PREDNISONE 40 MG: 20 TABLET ORAL at 01:04

## 2023-07-30 NOTE — ED NOTES
Patient leaves ER waiting room with family members but did not inform staff of departure. Patient is AOx4.       Antwon Lee RN  07/30/23 568 Problem: Chronic Conditions and Co-morbidities  Goal: Patient's chronic conditions and co-morbidity symptoms are monitored and maintained or improved  Outcome: Progressing     Problem: Discharge Planning  Goal: Discharge to home or other facility with appropriate resources  Outcome: Progressing     Problem: Pain  Goal: Verbalizes/displays adequate comfort level or baseline comfort level  Outcome: Progressing     Problem: Neurosensory - Adult  Goal: Achieves stable or improved neurological status  Outcome: Progressing  Goal: Absence of seizures  Outcome: Progressing  Goal: Remains free of injury related to seizures activity  Outcome: Progressing  Goal: Achieves maximal functionality and self care  Outcome: Progressing     Problem: Respiratory - Adult  Goal: Achieves optimal ventilation and oxygenation  Outcome: Progressing     Problem: Cardiovascular - Adult  Goal: Maintains optimal cardiac output and hemodynamic stability  Outcome: Progressing  Goal: Absence of cardiac dysrhythmias or at baseline  Outcome: Progressing     Problem: Skin/Tissue Integrity - Adult  Goal: Skin integrity remains intact  Outcome: Progressing  Goal: Incisions, wounds, or drain sites healing without S/S of infection  Outcome: Progressing  Goal: Oral mucous membranes remain intact  Outcome: Progressing     Problem: Musculoskeletal - Adult  Goal: Return mobility to safest level of function  Outcome: Progressing  Goal: Maintain proper alignment of affected body part  Outcome: Progressing  Goal: Return ADL status to a safe level of function  Outcome: Progressing     Problem: Genitourinary - Adult  Goal: Absence of urinary retention  Outcome: Progressing  Goal: Urinary catheter remains patent  Outcome: Progressing     Problem: Hematologic - Adult  Goal: Maintains hematologic stability  Outcome: Progressing     Problem: Nutrition Deficit:  Goal: Optimize nutritional status  Outcome: Progressing     Problem: Skin/Tissue Integrity  Goal: Absence of new skin breakdown  Description: 1. Monitor for areas of redness and/or skin breakdown  2. Assess vascular access sites hourly  3. Every 4-6 hours minimum:  Change oxygen saturation probe site  4. Every 4-6 hours:  If on nasal continuous positive airway pressure, respiratory therapy assess nares and determine need for appliance change or resting period. Outcome: Progressing     Problem: Safety - Adult  Goal: Free from fall injury  Outcome: Progressing     Problem: Confusion  Goal: Confusion, delirium, dementia, or psychosis is improved or at baseline  Description: INTERVENTIONS:  1. Assess for possible contributors to thought disturbance, including medications, impaired vision or hearing, underlying metabolic abnormalities, dehydration, psychiatric diagnoses, and notify attending LIP  2. [place] high risk fall precautions, as indicated  3. Provide frequent short contacts to provide reality reorientation, refocusing and direction  4. Decrease environmental stimuli, including noise as appropriate  5. Monitor and intervene to maintain adequate nutrition, hydration, elimination, sleep and activity  6. If unable to ensure safety without constant attention obtain sitter and review sitter guidelines with assigned personnel  7.  Initiate Psychosocial CNS and Spiritual Care consult, as indicated  Outcome: Progressing

## 2023-07-30 NOTE — ED NOTES
Patient did not leave, just went outside for air. Provider aware.      Evi Sommers RN  07/30/23 0028

## 2023-07-30 NOTE — ED NOTES
Dr. Modesta Turk notified of patient leaving after RN triage.       Abram Pereyra, RN  07/30/23 4452

## 2023-08-21 ENCOUNTER — OFFICE VISIT (OUTPATIENT)
Dept: FAMILY MEDICINE CLINIC | Age: 23
End: 2023-08-21
Payer: COMMERCIAL

## 2023-08-21 VITALS
RESPIRATION RATE: 18 BRPM | TEMPERATURE: 99.2 F | DIASTOLIC BLOOD PRESSURE: 80 MMHG | BODY MASS INDEX: 36.68 KG/M2 | HEIGHT: 71 IN | SYSTOLIC BLOOD PRESSURE: 130 MMHG | OXYGEN SATURATION: 97 % | HEART RATE: 101 BPM | WEIGHT: 262 LBS

## 2023-08-21 DIAGNOSIS — J02.9 SORE THROAT: Primary | ICD-10-CM

## 2023-08-21 LAB — S PYO AG THROAT QL: NORMAL

## 2023-08-21 PROCEDURE — 87880 STREP A ASSAY W/OPTIC: CPT | Performed by: STUDENT IN AN ORGANIZED HEALTH CARE EDUCATION/TRAINING PROGRAM

## 2023-08-21 PROCEDURE — 99213 OFFICE O/P EST LOW 20 MIN: CPT | Performed by: STUDENT IN AN ORGANIZED HEALTH CARE EDUCATION/TRAINING PROGRAM

## 2023-08-21 RX ORDER — METHYLPREDNISOLONE 4 MG/1
TABLET ORAL
Qty: 1 KIT | Refills: 0 | Status: SHIPPED | OUTPATIENT
Start: 2023-08-21 | End: 2023-08-27

## 2023-08-21 RX ORDER — AMOXICILLIN 500 MG/1
500 CAPSULE ORAL 2 TIMES DAILY
Qty: 20 CAPSULE | Refills: 0 | Status: SHIPPED | OUTPATIENT
Start: 2023-08-21 | End: 2023-08-31

## 2023-08-21 ASSESSMENT — ENCOUNTER SYMPTOMS
NAUSEA: 0
COUGH: 1
VOMITING: 0
SORE THROAT: 1
RHINORRHEA: 0
ABDOMINAL PAIN: 0
SHORTNESS OF BREATH: 0

## 2023-08-21 NOTE — PROGRESS NOTES
Maria Clifton (:  2000) is a 20 y.o. male,Established patient, here for evaluation of the following chief complaint(s):  Drainage and Sore Throat (Onset x 2 days )         ASSESSMENT/PLAN:  1. Sore throat  -     POCT rapid strep A  -     methylPREDNISolone (MEDROL DOSEPACK) 4 MG tablet; Take by mouth., Disp-1 kit, R-0Normal  -     amoxicillin (AMOXIL) 500 MG capsule; Take 1 capsule by mouth 2 times daily for 10 days, Disp-20 capsule, R-0Normal    Strep negative. Declined COVID testing. Steroids for symptomatic relief. If not improving in 2-3 days OK to start abx. Discussed return and ER precautions. Patient and or parent verbalized understanding    Return if symptoms worsen or fail to improve. Subjective   SUBJECTIVE/OBJECTIVE:  Sore throat  Worse on the R  Felt like it started 2 days ago  Had some elevated temp  +cough  Had some muscle aches  Having some congestion  Took some sudafed       Review of Systems   Constitutional:  Negative for chills and fever. HENT:  Positive for congestion, postnasal drip and sore throat. Negative for rhinorrhea. Respiratory:  Positive for cough. Negative for shortness of breath. Cardiovascular:  Negative for chest pain and leg swelling. Gastrointestinal:  Negative for abdominal pain, nausea and vomiting. Genitourinary:  Negative for dysuria and hematuria. Musculoskeletal:  Negative for arthralgias and myalgias. Skin:  Negative for rash and wound. Neurological:  Negative for dizziness and light-headedness. Objective   Physical Exam  Vitals reviewed. Constitutional:       General: He is not in acute distress. HENT:      Head: Normocephalic and atraumatic. Nose: Congestion present. Mouth/Throat:      Pharynx: Oropharyngeal exudate and posterior oropharyngeal erythema present. Eyes:      Extraocular Movements: Extraocular movements intact.       Conjunctiva/sclera: Conjunctivae normal.   Cardiovascular:      Rate and Rhythm:

## 2023-09-26 RX ORDER — CITALOPRAM 40 MG/1
40 TABLET ORAL DAILY
Qty: 30 TABLET | Refills: 0 | Status: SHIPPED | OUTPATIENT
Start: 2023-09-26

## 2023-09-27 ENCOUNTER — OFFICE VISIT (OUTPATIENT)
Dept: FAMILY MEDICINE CLINIC | Age: 23
End: 2023-09-27

## 2023-09-27 VITALS
DIASTOLIC BLOOD PRESSURE: 76 MMHG | OXYGEN SATURATION: 97 % | RESPIRATION RATE: 16 BRPM | SYSTOLIC BLOOD PRESSURE: 132 MMHG | WEIGHT: 260 LBS | HEART RATE: 83 BPM | TEMPERATURE: 98.1 F | HEIGHT: 71 IN | BODY MASS INDEX: 36.4 KG/M2

## 2023-09-27 DIAGNOSIS — Z02.1 PHYSICAL EXAM, PRE-EMPLOYMENT: Primary | ICD-10-CM

## 2023-09-27 NOTE — PROGRESS NOTES
2023     Abigail Nguyen 20 y.o. male    : 2000  Chief Complaint:   Employment Physical      History of Present Illness   Source of history provided by:  patient. Abigail Nguyen is a 20 y.o. old male who presents to the [facility] care for a preemployment physical for [employer] in [place]. Pt reports to be feeling well without any complaints at this time. He denies any CP with exertion, [place], dizziness with exertion, history of syncope without trauma, palpitations, weakness in extremities, recent illness, previous cardiac issues, seizure history, or asthma history. Denies any family history of sudden cardiac death. Pt denies any drug, ETOH, or tobacco use. Wears seat belt in the car at all times. Denies any thoughts of suicide or poorly controlled anxiety/depression. ROS   Past Medical History:  has a past medical history of Ganglion cyst, Hyperlipidemia, Obesity, and Seasonal allergies. Past Surgical History:  has a past surgical history that includes cyst removal.  Social History:  reports that he has never smoked. He has never used smokeless tobacco. He reports that he does not drink alcohol. Family History: family history includes Diabetes in his mother; High Blood Pressure in his mother; High Cholesterol in his mother; Kidney stones in his father; Obesity in his mother; Other in his father. Allergies: Benadryl [diphenhydramine]    Unless otherwise stated in this report the patient's positive and negative responses for review of systems for constitutional, eyes, ENT, cardiovascular, respiratory, gastrointestinal, neurological, , musculoskeletal, and integument systems and related systems to the presenting problem are either stated in the history of present illness or were not pertinent or were negative for the symptoms and/or complaints related to the presenting medical problem. Positives and pertinent negatives as per HPI. All others reviewed and are negative.     Physical

## 2023-11-08 ENCOUNTER — OFFICE VISIT (OUTPATIENT)
Dept: FAMILY MEDICINE CLINIC | Age: 23
End: 2023-11-08
Payer: COMMERCIAL

## 2023-11-08 VITALS
TEMPERATURE: 98.9 F | WEIGHT: 266 LBS | HEART RATE: 87 BPM | BODY MASS INDEX: 37.1 KG/M2 | DIASTOLIC BLOOD PRESSURE: 70 MMHG | SYSTOLIC BLOOD PRESSURE: 130 MMHG | OXYGEN SATURATION: 97 %

## 2023-11-08 DIAGNOSIS — L65.9 HAIR LOSS: ICD-10-CM

## 2023-11-08 DIAGNOSIS — F32.9 REACTIVE DEPRESSION: Primary | ICD-10-CM

## 2023-11-08 DIAGNOSIS — E78.2 MIXED HYPERLIPIDEMIA: ICD-10-CM

## 2023-11-08 DIAGNOSIS — E66.09 CLASS 2 OBESITY DUE TO EXCESS CALORIES WITHOUT SERIOUS COMORBIDITY WITH BODY MASS INDEX (BMI) OF 37.0 TO 37.9 IN ADULT: ICD-10-CM

## 2023-11-08 PROCEDURE — 99214 OFFICE O/P EST MOD 30 MIN: CPT | Performed by: INTERNAL MEDICINE

## 2023-11-08 RX ORDER — BUPROPION HYDROCHLORIDE 150 MG/1
150 TABLET ORAL EVERY MORNING
Qty: 30 TABLET | Refills: 5 | Status: SHIPPED | OUTPATIENT
Start: 2023-11-08

## 2023-11-08 RX ORDER — CITALOPRAM 40 MG/1
40 TABLET ORAL DAILY
Qty: 30 TABLET | Refills: 5 | Status: CANCELLED | OUTPATIENT
Start: 2023-11-08

## 2023-11-08 RX ORDER — CITALOPRAM 40 MG/1
40 TABLET ORAL DAILY
Qty: 30 TABLET | Refills: 0 | Status: SHIPPED | OUTPATIENT
Start: 2023-11-08

## 2023-12-29 ENCOUNTER — OFFICE VISIT (OUTPATIENT)
Dept: PODIATRY | Age: 23
End: 2023-12-29
Payer: COMMERCIAL

## 2023-12-29 DIAGNOSIS — M65.9 TENOSYNOVITIS OF RIGHT FOOT: ICD-10-CM

## 2023-12-29 DIAGNOSIS — M72.2 PLANTAR FASCIAL FIBROMATOSIS: Primary | ICD-10-CM

## 2023-12-29 DIAGNOSIS — M79.671 RIGHT FOOT PAIN: ICD-10-CM

## 2023-12-29 DIAGNOSIS — R60.0 LOCALIZED EDEMA: ICD-10-CM

## 2023-12-29 PROCEDURE — 99203 OFFICE O/P NEW LOW 30 MIN: CPT | Performed by: PODIATRIST

## 2023-12-29 NOTE — PROGRESS NOTES
New patient in office with c/o right foot pain. Sx x several months. Denies any injury. Xrays obtained prior to apt.       23  Amber Valdes : 2000 Sex: male  Age: 20 y.o. Patient was referred by Tere Wilkinson MD    CC:    Right heel and right foot pain    HPI:   This pleasant 18-year-old male patient referred to me today for right heel and right foot pain symptoms present several months. No recent injury or trauma. Did have radiographs right foot today. Does state he works as a  at [employer] and does stand and walk most of the day. Denies injury or trauma. Does also get occasional heel pain on the left heel. Does state tenderness in the morning and at the end of the day after he has been on his feet standing. No current use of custom or over-the-counter inserts. Does take occasional ibuprofen. Denies calf pain. Symptoms present several months. No additional pedal complaints at this time.     ROS:  Const: Denies constitutional symptoms  Musculo: Denies symptoms other than stated above  Skin: Denies symptoms other than stated above       Current Outpatient Medications:     diclofenac sodium (VOLTAREN) 1 % GEL, Apply topically 2 times daily, Disp: 100 g, Rfl: 2    citalopram (CELEXA) 40 MG tablet, Take 1 tablet by mouth daily, Disp: 30 tablet, Rfl: 0    buPROPion (WELLBUTRIN XL) 150 MG extended release tablet, Take 1 tablet by mouth every morning, Disp: 30 tablet, Rfl: 5    EPINEPHrine (EPIPEN 2-BERE) 0.3 MG/0.3ML SOAJ injection, Inject 0.3 mLs into the muscle once for 1 dose Use as directed for allergic reaction, Disp: 1 each, Rfl: 0    ibuprofen (IBU) 800 MG tablet, Take 1 tablet by mouth every 8 hours as needed for Pain, Disp: 21 tablet, Rfl: 0    fluticasone (FLONASE ALLERGY RELIEF) 50 MCG/ACT nasal spray, 2 sprays by Each Nostril route as needed for Allergies, Disp: 1 Bottle, Rfl: 5    fexofenadine (ALLEGRA) 180 MG tablet, Take 1 tablet by mouth daily, Disp: , Rfl:

## 2024-06-07 RX ORDER — BUPROPION HYDROCHLORIDE 150 MG/1
150 TABLET ORAL EVERY MORNING
Qty: 30 TABLET | Refills: 2 | Status: SHIPPED | OUTPATIENT
Start: 2024-06-07

## 2024-06-07 NOTE — TELEPHONE ENCOUNTER
Last Appointment:  11/8/2023  Future Appointments   Date Time Provider Department Center   6/12/2024  9:00 AM Dominik Guardado MD [facility]

## 2024-06-12 ENCOUNTER — OFFICE VISIT (OUTPATIENT)
Dept: FAMILY MEDICINE CLINIC | Age: 24
End: 2024-06-12
Payer: COMMERCIAL

## 2024-06-12 VITALS
BODY MASS INDEX: 36.4 KG/M2 | TEMPERATURE: 98.2 F | OXYGEN SATURATION: 96 % | WEIGHT: 261 LBS | HEART RATE: 75 BPM | DIASTOLIC BLOOD PRESSURE: 80 MMHG | SYSTOLIC BLOOD PRESSURE: 120 MMHG

## 2024-06-12 DIAGNOSIS — E78.2 MIXED HYPERLIPIDEMIA: ICD-10-CM

## 2024-06-12 DIAGNOSIS — R31.9 HEMATURIA, UNSPECIFIED TYPE: ICD-10-CM

## 2024-06-12 DIAGNOSIS — E66.09 CLASS 2 OBESITY DUE TO EXCESS CALORIES WITHOUT SERIOUS COMORBIDITY WITH BODY MASS INDEX (BMI) OF 37.0 TO 37.9 IN ADULT: ICD-10-CM

## 2024-06-12 DIAGNOSIS — F32.9 REACTIVE DEPRESSION: ICD-10-CM

## 2024-06-12 DIAGNOSIS — Z20.822 EXPOSURE TO COVID-19 VIRUS: ICD-10-CM

## 2024-06-12 DIAGNOSIS — Z20.822 EXPOSURE TO COVID-19 VIRUS: Primary | ICD-10-CM

## 2024-06-12 LAB
ALBUMIN: 4.5 G/DL (ref 3.5–5.2)
ALP BLD-CCNC: 83 U/L (ref 40–129)
ALT SERPL-CCNC: 62 U/L (ref 0–40)
ANION GAP SERPL CALCULATED.3IONS-SCNC: 14 MMOL/L (ref 7–16)
AST SERPL-CCNC: 35 U/L (ref 0–39)
BILIRUB SERPL-MCNC: 0.5 MG/DL (ref 0–1.2)
BILIRUBIN, URINE: NEGATIVE
BUN BLDV-MCNC: 11 MG/DL (ref 6–20)
CALCIUM SERPL-MCNC: 9.8 MG/DL (ref 8.6–10.2)
CHLORIDE BLD-SCNC: 101 MMOL/L (ref 98–107)
CHOLESTEROL, TOTAL: 214 MG/DL
CO2: 24 MMOL/L (ref 22–29)
COLOR: YELLOW
COMMENT: NORMAL
CREAT SERPL-MCNC: 0.7 MG/DL (ref 0.7–1.2)
GFR, ESTIMATED: >90 ML/MIN/1.73M2
GLUCOSE BLD-MCNC: 76 MG/DL (ref 74–99)
GLUCOSE URINE: NEGATIVE MG/DL
HDLC SERPL-MCNC: 38 MG/DL
KETONES, URINE: NEGATIVE MG/DL
LDL CHOLESTEROL: 136 MG/DL
LEUKOCYTE ESTERASE, URINE: NEGATIVE
Lab: NORMAL
NITRITE, URINE: NEGATIVE
PERFORMING INSTRUMENT: NORMAL
PH, URINE: 6.5 (ref 5–9)
POTASSIUM SERPL-SCNC: 4.4 MMOL/L (ref 3.5–5)
PROTEIN UA: NEGATIVE MG/DL
QC PASS/FAIL: NORMAL
SARS-COV-2, POC: NORMAL
SODIUM BLD-SCNC: 139 MMOL/L (ref 132–146)
SPECIFIC GRAVITY UA: 1.02 (ref 1–1.03)
TOTAL PROTEIN: 7.9 G/DL (ref 6.4–8.3)
TRIGL SERPL-MCNC: 202 MG/DL
TURBIDITY: CLEAR
URINE HGB: NEGATIVE
UROBILINOGEN, URINE: 0.2 EU/DL (ref 0–1)
VLDLC SERPL CALC-MCNC: 40 MG/DL

## 2024-06-12 PROCEDURE — 99214 OFFICE O/P EST MOD 30 MIN: CPT | Performed by: INTERNAL MEDICINE

## 2024-06-12 PROCEDURE — 87426 SARSCOV CORONAVIRUS AG IA: CPT | Performed by: INTERNAL MEDICINE

## 2024-06-12 ASSESSMENT — PATIENT HEALTH QUESTIONNAIRE - PHQ9
SUM OF ALL RESPONSES TO PHQ QUESTIONS 1-9: 2
8. MOVING OR SPEAKING SO SLOWLY THAT OTHER PEOPLE COULD HAVE NOTICED. OR THE OPPOSITE, BEING SO FIGETY OR RESTLESS THAT YOU HAVE BEEN MOVING AROUND A LOT MORE THAN USUAL: NOT AT ALL
2. FEELING DOWN, DEPRESSED OR HOPELESS: NOT AT ALL
SUM OF ALL RESPONSES TO PHQ QUESTIONS 1-9: 2
SUM OF ALL RESPONSES TO PHQ QUESTIONS 1-9: 2
3. TROUBLE FALLING OR STAYING ASLEEP: NOT AT ALL
5. POOR APPETITE OR OVEREATING: NOT AT ALL
1. LITTLE INTEREST OR PLEASURE IN DOING THINGS: NOT AT ALL
9. THOUGHTS THAT YOU WOULD BE BETTER OFF DEAD, OR OF HURTING YOURSELF: NOT AT ALL
10. IF YOU CHECKED OFF ANY PROBLEMS, HOW DIFFICULT HAVE THESE PROBLEMS MADE IT FOR YOU TO DO YOUR WORK, TAKE CARE OF THINGS AT HOME, OR GET ALONG WITH OTHER PEOPLE: NOT DIFFICULT AT ALL
4. FEELING TIRED OR HAVING LITTLE ENERGY: MORE THAN HALF THE DAYS
6. FEELING BAD ABOUT YOURSELF - OR THAT YOU ARE A FAILURE OR HAVE LET YOURSELF OR YOUR FAMILY DOWN: NOT AT ALL
SUM OF ALL RESPONSES TO PHQ QUESTIONS 1-9: 2
SUM OF ALL RESPONSES TO PHQ9 QUESTIONS 1 & 2: 0
7. TROUBLE CONCENTRATING ON THINGS, SUCH AS READING THE NEWSPAPER OR WATCHING TELEVISION: NOT AT ALL

## 2024-06-12 NOTE — PROGRESS NOTES
Patient has a history of gross hematuria previously.  He did have a workup by [facility] urology.  I do not have those notes.  He did have cytology performed and I did look at that note.  The patient was ordered a CT urogram but he was advised by urology not to complete it because they did not feel like it was necessary.  Patient has not had recurrent symptoms.  Patient did taper off the Celexa and is now currently on Wellbutrin.  Seems to be doing well with that medication alone.  His allergies are somewhat variable but worse during the seasonal activity.  The patient currently is alternating fexofenadine with Flonase.  We did discuss possibly using a saline nasal wash.  Patient states that he has a lot of dry mouth and throat especially in the morning.  He denies any snoring that he knows of.  He denies any apnea.  ROS:  Const: General health stated as good.  Eyes: Denies eye symptoms.  ENMT: Allergic rhinitis with postnasal drip.  CV: Denies cardiovascular symptoms.  Resp: Denies respiratory symptoms.  GI: Negative  : as per HPI  Musculo: Bilateral foot pain.  Improved with shoe change and Voltaren gel.  Skin: Denies skin, hair and nail symptoms.  Neuro: GANGLION CYSTS  Psych:Stress/anxiety.  Seems stable with citalopram.  Endocrine: Reports obesity.  Hema/Lymph: Denies hematologic symptoms.  Allergies: NKDA  PMH:  Health Maintenance:  Physical Exam -2024  Menactra - (3/25/2016)  Flu 10/28/2020  Medical Problems:  Seasonal Allergies Reffered to [facility] 2019  , Obesity, Ganglion Cysts, Hyperlipidemia  Anxiety-improved with Wellbutrin  Male pattern baldness  History hematuria  eval by urology  Surgical Hx:  Ganglion Cyst Removal - CATREINA-WRIST/TAILBONE  Reviewed and updated.  FH:  Father:   --had hx of kidney stones, Glioblastoma Multiforme- Age 33.  Mother:  Obesity, Non Insulin Dependent Diabetes, Hypertension, Hyperlipidemia.  Reviewed, no changes.  SH:  Marital: Single.  Personal

## 2024-07-17 ENCOUNTER — OFFICE VISIT (OUTPATIENT)
Dept: FAMILY MEDICINE CLINIC | Age: 24
End: 2024-07-17
Payer: COMMERCIAL

## 2024-07-17 VITALS
RESPIRATION RATE: 18 BRPM | SYSTOLIC BLOOD PRESSURE: 120 MMHG | HEIGHT: 71 IN | WEIGHT: 259 LBS | BODY MASS INDEX: 36.26 KG/M2 | DIASTOLIC BLOOD PRESSURE: 74 MMHG | HEART RATE: 68 BPM | OXYGEN SATURATION: 98 % | TEMPERATURE: 97.1 F

## 2024-07-17 DIAGNOSIS — T63.441A BEE STING, ACCIDENTAL OR UNINTENTIONAL, INITIAL ENCOUNTER: Primary | ICD-10-CM

## 2024-07-17 PROCEDURE — 96372 THER/PROPH/DIAG INJ SC/IM: CPT | Performed by: FAMILY MEDICINE

## 2024-07-17 PROCEDURE — 99213 OFFICE O/P EST LOW 20 MIN: CPT | Performed by: FAMILY MEDICINE

## 2024-07-17 RX ORDER — EPINEPHRINE 0.3 MG/.3ML
0.3 INJECTION SUBCUTANEOUS ONCE
Qty: 1 EACH | Refills: 0 | Status: SHIPPED | OUTPATIENT
Start: 2024-07-17 | End: 2024-07-17

## 2024-07-17 RX ORDER — EPINEPHRINE 0.3 MG/.3ML
0.3 INJECTION SUBCUTANEOUS ONCE
Qty: 1 EACH | Refills: 0 | Status: CANCELLED | OUTPATIENT
Start: 2024-07-17 | End: 2024-07-17

## 2024-07-17 RX ORDER — METHYLPREDNISOLONE ACETATE 40 MG/ML
40 INJECTION, SUSPENSION INTRA-ARTICULAR; INTRALESIONAL; INTRAMUSCULAR; SOFT TISSUE ONCE
Status: COMPLETED | OUTPATIENT
Start: 2024-07-17 | End: 2024-07-17

## 2024-07-17 RX ORDER — EPINEPHRINE 0.3 MG/.3ML
INJECTION SUBCUTANEOUS
Status: CANCELLED | OUTPATIENT
Start: 2024-07-17

## 2024-07-17 RX ADMIN — METHYLPREDNISOLONE ACETATE 40 MG: 40 INJECTION, SUSPENSION INTRA-ARTICULAR; INTRALESIONAL; INTRAMUSCULAR; SOFT TISSUE at 12:41

## 2024-07-17 ASSESSMENT — ENCOUNTER SYMPTOMS
COUGH: 0
PHOTOPHOBIA: 0
BLOOD IN STOOL: 0
BACK PAIN: 0
SINUS PAIN: 0
SORE THROAT: 0
DIARRHEA: 0
WHEEZING: 0
COLOR CHANGE: 0
TROUBLE SWALLOWING: 0
EYE REDNESS: 0
VOMITING: 0
CONSTIPATION: 0
ABDOMINAL PAIN: 0
SHORTNESS OF BREATH: 0

## 2024-07-18 NOTE — PROGRESS NOTES
swelling  Pulmonary:      Effort: Pulmonary effort is normal.      Breath sounds: No wheezing, rhonchi or rales.   Abdominal:      General: Bowel sounds are normal.      Palpations: There is no mass.      Tenderness: There is no abdominal tenderness. There is no guarding.      Hernia: No hernia is present.   Skin:     Coloration: Skin is not jaundiced.      Findings: No bruising.   Neurological:      Mental Status: He is alert.           Kishor was seen today for insect bite.    Diagnoses and all orders for this visit:    Bee sting, accidental or unintentional, initial encounter  -     methylPREDNISolone acetate (DEPO-MEDROL) injection 40 mg  -     EPINEPHrine (EPIPEN 2-BERE) 0.3 MG/0.3ML SOAJ injection; Inject 0.3 mLs into the muscle once for 1 dose Use as directed for allergic reaction  No sign of anaphylaxis which is fortunate. Lists Benadryl allergy. Epi pen didn't work, father and son feel they know how to use one now.        No follow-ups on file.    Electronically signed by John Umaña MD on 7/17/24 at 11:12 PM EDT

## 2024-09-11 RX ORDER — BUPROPION HYDROCHLORIDE 150 MG/1
150 TABLET ORAL EVERY MORNING
Qty: 90 TABLET | Refills: 1 | Status: SHIPPED | OUTPATIENT
Start: 2024-09-11

## 2025-03-12 RX ORDER — BUPROPION HYDROCHLORIDE 150 MG/1
TABLET ORAL
Qty: 90 TABLET | Refills: 1 | Status: SHIPPED | OUTPATIENT
Start: 2025-03-12

## 2025-03-12 NOTE — TELEPHONE ENCOUNTER
Name of Medication(s) Requested:  Requested Prescriptions     Pending Prescriptions Disp Refills    buPROPion (WELLBUTRIN XL) 150 MG extended release tablet [Pharmacy Med Name: buPROPion HCl ER (XL) Oral Tablet Extended Release 24 Hour 150 MG] 90 tablet 1     Sig: TAKE ONE TABLET BY MOUTH EVERY DAY IN THE MORNING       Medication is on current medication list Yes    Dosage and directions were verified? Yes    Quantity verified: 90 day supply     Pharmacy Verified?  Yes    Last Appointment:  6/12/2024    Future appts:  No future appointments.     (If no appt send self scheduling link. .REFILLAPPT)  Scheduling request sent?     [] Yes  [x] No    Does patient need updated?  [] Yes  [x] No